# Patient Record
Sex: FEMALE | Race: WHITE | NOT HISPANIC OR LATINO | Employment: OTHER | URBAN - METROPOLITAN AREA
[De-identification: names, ages, dates, MRNs, and addresses within clinical notes are randomized per-mention and may not be internally consistent; named-entity substitution may affect disease eponyms.]

---

## 2017-01-12 DIAGNOSIS — N64.59 OTHER SIGNS AND SYMPTOMS IN BREAST: ICD-10-CM

## 2017-01-12 DIAGNOSIS — R92.8 OTHER ABNORMAL AND INCONCLUSIVE FINDINGS ON DIAGNOSTIC IMAGING OF BREAST: ICD-10-CM

## 2017-01-12 DIAGNOSIS — Z12.31 ENCOUNTER FOR SCREENING MAMMOGRAM FOR MALIGNANT NEOPLASM OF BREAST: ICD-10-CM

## 2017-01-23 ENCOUNTER — GENERIC CONVERSION - ENCOUNTER (OUTPATIENT)
Dept: OTHER | Facility: OTHER | Age: 67
End: 2017-01-23

## 2017-02-08 ENCOUNTER — ALLSCRIPTS OFFICE VISIT (OUTPATIENT)
Dept: OTHER | Facility: OTHER | Age: 67
End: 2017-02-08

## 2017-02-08 ENCOUNTER — TRANSCRIBE ORDERS (OUTPATIENT)
Dept: ADMINISTRATIVE | Facility: HOSPITAL | Age: 67
End: 2017-02-08

## 2017-02-08 DIAGNOSIS — R92.8 ABNORMAL MAMMOGRAM, UNSPECIFIED: Primary | ICD-10-CM

## 2017-02-22 ENCOUNTER — GENERIC CONVERSION - ENCOUNTER (OUTPATIENT)
Dept: OTHER | Facility: OTHER | Age: 67
End: 2017-02-22

## 2017-05-11 ENCOUNTER — ALLSCRIPTS OFFICE VISIT (OUTPATIENT)
Dept: OTHER | Facility: OTHER | Age: 67
End: 2017-05-11

## 2017-05-11 ENCOUNTER — TRANSCRIBE ORDERS (OUTPATIENT)
Dept: LAB | Facility: CLINIC | Age: 67
End: 2017-05-11

## 2017-05-11 ENCOUNTER — APPOINTMENT (OUTPATIENT)
Dept: LAB | Facility: CLINIC | Age: 67
End: 2017-05-11
Payer: MEDICARE

## 2017-05-11 DIAGNOSIS — E03.9 HYPOTHYROIDISM: ICD-10-CM

## 2017-05-11 LAB — TSH SERPL DL<=0.05 MIU/L-ACNC: 2.85 UIU/ML (ref 0.36–3.74)

## 2017-05-11 PROCEDURE — 36415 COLL VENOUS BLD VENIPUNCTURE: CPT

## 2017-05-11 PROCEDURE — 84443 ASSAY THYROID STIM HORMONE: CPT

## 2017-07-31 ENCOUNTER — TRANSCRIBE ORDERS (OUTPATIENT)
Dept: ADMINISTRATIVE | Facility: HOSPITAL | Age: 67
End: 2017-07-31

## 2017-07-31 ENCOUNTER — ALLSCRIPTS OFFICE VISIT (OUTPATIENT)
Dept: OTHER | Facility: OTHER | Age: 67
End: 2017-07-31

## 2017-07-31 ENCOUNTER — TRANSCRIBE ORDERS (OUTPATIENT)
Dept: MAMMOGRAPHY | Facility: CLINIC | Age: 67
End: 2017-07-31

## 2017-07-31 ENCOUNTER — HOSPITAL ENCOUNTER (OUTPATIENT)
Dept: ULTRASOUND IMAGING | Facility: CLINIC | Age: 67
Discharge: HOME | End: 2017-07-31
Payer: COMMERCIAL

## 2017-07-31 ENCOUNTER — APPOINTMENT (OUTPATIENT)
Dept: MAMMOGRAPHY | Facility: CLINIC | Age: 67
End: 2017-07-31
Payer: COMMERCIAL

## 2017-07-31 DIAGNOSIS — R92.8 ABNORMAL MAMMOGRAM, UNSPECIFIED: ICD-10-CM

## 2017-07-31 DIAGNOSIS — R92.8 ABNORMAL MAMMOGRAM, UNSPECIFIED: Primary | ICD-10-CM

## 2017-07-31 DIAGNOSIS — Z12.31 VISIT FOR SCREENING MAMMOGRAM: Primary | ICD-10-CM

## 2017-07-31 PROCEDURE — 76642 ULTRASOUND BREAST LIMITED: CPT

## 2017-11-21 ENCOUNTER — ALLSCRIPTS OFFICE VISIT (OUTPATIENT)
Dept: OTHER | Facility: OTHER | Age: 67
End: 2017-11-21

## 2017-11-21 ENCOUNTER — TRANSCRIBE ORDERS (OUTPATIENT)
Dept: ADMINISTRATIVE | Facility: HOSPITAL | Age: 67
End: 2017-11-21

## 2017-11-21 ENCOUNTER — LAB REQUISITION (OUTPATIENT)
Dept: LAB | Facility: HOSPITAL | Age: 67
End: 2017-11-21
Payer: MEDICARE

## 2017-11-21 ENCOUNTER — APPOINTMENT (OUTPATIENT)
Dept: LAB | Facility: MEDICAL CENTER | Age: 67
End: 2017-11-21
Payer: MEDICARE

## 2017-11-21 DIAGNOSIS — E03.9 HYPOTHYROIDISM: ICD-10-CM

## 2017-11-21 DIAGNOSIS — Z01.419 ENCOUNTER FOR GYNECOLOGICAL EXAMINATION WITHOUT ABNORMAL FINDING: ICD-10-CM

## 2017-11-21 LAB
T4 FREE SERPL-MCNC: 1.08 NG/DL (ref 0.76–1.46)
TSH SERPL DL<=0.05 MIU/L-ACNC: 2.53 UIU/ML (ref 0.36–3.74)

## 2017-11-21 PROCEDURE — 36415 COLL VENOUS BLD VENIPUNCTURE: CPT

## 2017-11-21 PROCEDURE — G0145 SCR C/V CYTO,THINLAYER,RESCR: HCPCS | Performed by: OBSTETRICS & GYNECOLOGY

## 2017-11-21 PROCEDURE — 84439 ASSAY OF FREE THYROXINE: CPT

## 2017-11-21 PROCEDURE — 84443 ASSAY THYROID STIM HORMONE: CPT

## 2017-11-22 NOTE — PROGRESS NOTES
Assessment  1  Post-menopause atrophic vaginitis (627 3) (N95 2)   2  Encounter for routine gynecological examination (V72 31) (Z01 419)    Plan  Hypothyroidism    · (1) T4, FREE; Status:Active; Requested TOT:[id number];    Perform:[facility]; YAH:[id number]; Ordered;For:Hypothyroidism; Ordered By:Josh Sanchez;   · (1) TSH; Status:Active; Requested GBD:[id number];    Perform:[facility]; RBT:[id number]; Ordered;For:Hypothyroidism; Ordered By:Josh Sanchez;  Post-menopause atrophic vaginitis    · Yuvafem 10 MCG Vaginal Tablet; Insert one tablet twice weekly as directed   Rx By: Oscar Holguin; Dispense: 90 Days ; #:26 Tablet; Refill: 3;Post-menopause atrophic vaginitis; ELENA = N; Verified Transmission to [address] [place] [address]; Last Updated By: System, [employer]; 11/21/2017 10:48:29 AM    Discussion/Summary  Pap test was done today Breast cancer screening: mammogram has been ordered and breast cancer screening is managed by Dr Polo Patel  Colorectal cancer screening: colorectal cancer screening is current  Osteoporosis screening: bone mineral density testing has been ordered  Refuill generic vagifemBV reoccurs try MTNZ gel  TSH FT4  Moved to [place], [place]  Will be getting new PCP there to manage hypothyroidism  Chief Complaint  Patient presents today for yearly exam      History of Present Illness  HPI: states she has had recurrent BV infections over past year  Each episode treated with clindamycin crea, Just finished course  No c/o at this timeon vagifem  Needs refill   GYN HM, Adult Female [facility]: The patient is being seen for a gynecology evaluation  The last health maintenance visit was 1 year(s) ago  General Health:  Reproductive health: the patient is postmenopausal    Screening:      Review of Systems   Constitutional: No fever, no chills, feels well, no tiredness, no recent weight gain or loss  Breasts: no complaints of breast pain, breast lump or nipple discharge  Gastrointestinal: no complaints of abdominal pain, no constipation, no nausea or diarrhea, no vomiting, no bloody stools  Genitourinary: no complaints of dysuria, no incontinence, no pelvic pain, no dysmenorrhea, no vaginal discharge or abnormal vaginal bleeding  Active Problems    1  Abnormal finding on breast imaging (793 89) (R92 8)   2  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)   3  Bacterial vaginosis (616 10,041 9) (N76 0,B96 89)   4  Bilateral fibrocystic breast changes (610 1) (N60 11,N60 12)   5  Dense breast tissue (793 82) (R92 2)   6  Elbow tendonitis (727 09) (M77 8)   7  Encounter for screening mammogram for malignant neoplasm of breast (V76 12) (Z12 31)   8  Enthesopathy of elbow (726 30) (M77 8)   9  Hyperlipidemia (272 4) (E78 5)   10  Hypothyroidism (244 9) (E03 9)   11  Influenza vaccination administered during current admission (V04 81) (Z23)   12  Menopause (627 2)   13  Obesity (278 00) (E66 9)   14  Post-menopause atrophic vaginitis (627 3) (N95 2)   15   Venous insufficiency (459 81) (I87 2)    Past Medical History   · History of Acute Supraclavicular Lymphadenitis On The Left (683)   · History of Benign colon polyp (211 3) (K63 5)   · History of Depression (311) (F32 9)   · History of Menarche (V21 8)   · History of Night sweats (780 8) (R61)   · History of Normal endoscopy   · History of Previously Pregnant With 2  Normal Vaginal Deliveries   · History of Summary Of Previous Pregnancies Para 2  (Deliveries)   · History of Trigeminal neuralgia (350 1) (G50 0)   · History of Uses birth control (V25 9) (Z30 9)    Surgical History     · History of Biopsy Breast Percutaneous Needle Core   · History of Cholecystectomy Laparoscopic   · History of Colonoscopy (Fiberoptic) Screening   · History of Foot Surgery   · History of Incisional Breast Biopsy    Family History  Mother    · Family history of hypothyroidism (V18 19) (Z83 49)   · Family history of Heart Disease (V17 49)  Father    · Family history of Liver Cancer   · Family history of Skin Cancer (V16 8)  Sister    · Family history of Heart Disease (V17 49)  Family History    · Family history of Heart disease   · Family history of Liver cancer   · Family history of Skin cancer    Social History     · Currently sexually active   · Lives with adult children   ·    · Moderate alcohol use   · Never A Smoker    Current Meds   1  Clindamycin Phosphate 2 % Vaginal Cream; INSERT 1 APPLICATORFUL INTRAVAGINALLY AT BEDTIMEx 7NIGHTS; Therapy: 63AXZ1305 to (Last Rx:30Oct2017)  Requested for: 76AIG7439 Ordered   2  Levothyroxine Sodium 50 MCG Oral Tablet; Take 1 tablet daily; Therapy: 70Aon7975 to (Last Rx:84Vwy4212)  Requested for: 54KOJ6917 Ordered    Allergies  1  No Known Drug Allergies    Vitals   Recorded: 43HOX9765 08:03JX   Systolic 176   Diastolic 80   Height 5 ft 3 78 in   Weight 197 lb 8 oz   BMI Calculated 34 14   BSA Calculated 1 94   LMP        Physical Exam   Constitutional  General appearance: No acute distress, well appearing and well nourished  Neck  Neck: Normal, supple, trachea midline, no masses  Thyroid: Normal, no thyromegaly  Pulmonary  Respiratory effort: No increased work of breathing or signs of respiratory distress  Auscultation of lungs: Clear to auscultation  Cardiovascular  Auscultation of heart: Normal rate and rhythm, normal S1 and S2, no murmurs  Peripheral vascular exam: Normal pulses Throughout  Genitourinary  External genitalia: Normal and no lesions appreciated  Vagina: Normal, no lesions or dryness appreciated  Urethra: Normal    Urethral meatus: Normal    Bladder: Normal, soft, non-tender and no prolapse or masses appreciated  Cervix: Normal, no palpable masses  Uterus: Normal, non-tender, not enlarged, and no palpable masses  Adnexa/parametria: Normal, non-tender and no fullness or masses appreciated  Chest  Breasts: Normal and no dimpling or skin changes noted     Abdomen  Abdomen: Normal, non-tender, and no organomegaly noted  Liver and spleen: No hepatomegaly or splenomegaly  Examination for hernias: No hernias appreciated  Lymphatic  Palpation of lymph nodes in neck, axillae, groin and/or other locations: No lymphadenopathy or masses noted  Psychiatric  Orientation to person, place, and time: Normal    Mood and affect: Normal        Future Appointments    Date/Time Provider Specialty Site   01/31/2018 11:30 AM DANNA Guillen   Surgical Oncology [facility] [place]       Signatures   Electronically signed by : William Mason DO; Nov 21 2017 11:00AM EST                       (Author)

## 2017-11-30 LAB
LAB AP GYN PRIMARY INTERPRETATION: NORMAL
Lab: NORMAL

## 2018-01-12 VITALS
SYSTOLIC BLOOD PRESSURE: 140 MMHG | WEIGHT: 197.5 LBS | DIASTOLIC BLOOD PRESSURE: 80 MMHG | HEIGHT: 64 IN | BODY MASS INDEX: 33.72 KG/M2

## 2018-01-13 VITALS
SYSTOLIC BLOOD PRESSURE: 126 MMHG | HEIGHT: 65 IN | HEART RATE: 92 BPM | BODY MASS INDEX: 31.57 KG/M2 | WEIGHT: 189.5 LBS | DIASTOLIC BLOOD PRESSURE: 80 MMHG | OXYGEN SATURATION: 96 %

## 2018-01-14 VITALS
TEMPERATURE: 96.5 F | DIASTOLIC BLOOD PRESSURE: 84 MMHG | OXYGEN SATURATION: 98 % | RESPIRATION RATE: 15 BRPM | BODY MASS INDEX: 32.15 KG/M2 | WEIGHT: 193 LBS | HEART RATE: 78 BPM | HEIGHT: 65 IN | SYSTOLIC BLOOD PRESSURE: 122 MMHG

## 2018-01-14 VITALS
WEIGHT: 189.13 LBS | HEIGHT: 65 IN | TEMPERATURE: 98.4 F | BODY MASS INDEX: 31.51 KG/M2 | HEART RATE: 90 BPM | RESPIRATION RATE: 14 BRPM | OXYGEN SATURATION: 97 % | DIASTOLIC BLOOD PRESSURE: 78 MMHG | SYSTOLIC BLOOD PRESSURE: 124 MMHG

## 2018-01-15 NOTE — MISCELLANEOUS
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)   4  Bacterial vaginosis (616 10,041 9) (N76 0,B96 89)   5  Bilateral fibrocystic breast changes (610 1) (N60 11,N60 12)   6  Dense breast tissue (793 82) (R92 2)   7  Elbow tendonitis (727 09) (M77 8)   8  Encounter for consultation (V65 9) (Z71 9)   9  Encounter for routine gynecological examination (V72 31) (Z01 419)   10  Encounter for routine gynecological examination with Papanicolaou smear of cervix    (V72 31,V76 2) (Z01 419)   11  Encounter for screening mammogram for malignant neoplasm of breast (V76 12)    (Z12 31)   12  Hyperlipidemia (272 4) (E78 5)   13  Hypothyroidism (244 9) (E03 9)   14  Menopause (627 2)   15  Obesity (278 00) (E66 9)   16  Post-menopause atrophic vaginitis (627 3) (N95 2)   17  Venous insufficiency (459 81) (I87 2)    Current Meds   1  Levothyroxine Sodium 50 MCG Oral Tablet; Take 1 tablet daily; Therapy: 29Wdo7049 to (Last Rx:73Zgf3288)  Requested for: 58Dcp9685 Ordered   2  Vagifem 10 MCG Vaginal Tablet; INSERT 1 TABLET TWICE WEEKLY AS DIRECTED; Therapy: 67NTV3366 to (Last Doon Splinter)  Requested for: 96QMM2081 Ordered    Allergies    1  No Known Drug Allergies    Plan  Bacterial vaginosis    · Clindamycin Phosphate 2 % Vaginal Cream (Cleocin);  INSERT 1  APPLICATORFUL INTRAVAGINALLY AT BEDTIMEx 7NIGHTS    Signatures   Electronically signed by : Simba Preciado, ; Feb 22 2017 11:41AM EST                       (Author)

## 2018-01-31 ENCOUNTER — HOSPITAL ENCOUNTER (OUTPATIENT)
Dept: MAMMOGRAPHY | Facility: CLINIC | Age: 68
Discharge: HOME | End: 2018-01-31
Payer: MEDICARE

## 2018-01-31 ENCOUNTER — HOSPITAL ENCOUNTER (OUTPATIENT)
Dept: ULTRASOUND IMAGING | Facility: CLINIC | Age: 68
Discharge: HOME | End: 2018-01-31
Payer: MEDICARE

## 2018-01-31 ENCOUNTER — OFFICE VISIT (OUTPATIENT)
Dept: SURGICAL ONCOLOGY | Facility: CLINIC | Age: 68
End: 2018-01-31
Payer: MEDICARE

## 2018-01-31 VITALS
DIASTOLIC BLOOD PRESSURE: 88 MMHG | HEART RATE: 82 BPM | RESPIRATION RATE: 14 BRPM | SYSTOLIC BLOOD PRESSURE: 128 MMHG | WEIGHT: 195 LBS | TEMPERATURE: 97.4 F | BODY MASS INDEX: 32.49 KG/M2 | HEIGHT: 65 IN

## 2018-01-31 DIAGNOSIS — Z12.31 VISIT FOR SCREENING MAMMOGRAM: ICD-10-CM

## 2018-01-31 DIAGNOSIS — R92.2 DENSE BREASTS: ICD-10-CM

## 2018-01-31 DIAGNOSIS — N60.12 BILATERAL FIBROCYSTIC BREAST DISEASE: Primary | ICD-10-CM

## 2018-01-31 DIAGNOSIS — R92.8 OTHER ABNORMAL AND INCONCLUSIVE FINDINGS ON DIAGNOSTIC IMAGING OF BREAST: ICD-10-CM

## 2018-01-31 DIAGNOSIS — N60.11 BILATERAL FIBROCYSTIC BREAST DISEASE: Primary | ICD-10-CM

## 2018-01-31 PROCEDURE — 99213 OFFICE O/P EST LOW 20 MIN: CPT | Performed by: SURGERY

## 2018-01-31 PROCEDURE — 77067 SCR MAMMO BI INCL CAD: CPT

## 2018-01-31 PROCEDURE — 77063 BREAST TOMOSYNTHESIS BI: CPT

## 2018-01-31 PROCEDURE — 76642 ULTRASOUND BREAST LIMITED: CPT

## 2018-01-31 RX ORDER — LEVOTHYROXINE SODIUM 0.05 MG/1
50 TABLET ORAL
COMMUNITY
Start: 2015-09-18 | End: 2018-07-08 | Stop reason: SDUPTHER

## 2018-01-31 NOTE — LETTER
January 31, 2018     Jeanine Weems MD  [address]  [address] [facility]    Patient: Tevin Griffin   YOB: 1950   Date of Visit: 1/31/2018       Dear Dr Chaparrita Quinteros: Thank you for referring Orlando Garrido to me for evaluation  Below are my notes for this consultation  If you have questions, please do not hesitate to call me  I look forward to following your patient along with you  Sincerely,        Debbie Virk MD        CC: No Recipients  Debbie Virk MD  1/31/2018  1:02 PM  Sign at close encounter     Surgical Oncology Follow Up       [facility]  1950  [id number]  [address]  [facility] [address] [address]    No chief complaint on file  Assessment/Plan     Advance Care Planning/Advance Directives:  Did not discuss  with the patient  No history exists  History of Present Illness: follow up breast care  -Interval History:n/a    Review of Systems:  Review of Systems   Constitutional: Negative  Negative for appetite change and fever  Eyes: Negative  Respiratory: Negative for shortness of breath  Cardiovascular: Negative  Gastrointestinal: Negative  Endocrine: Negative  Genitourinary: Negative  Musculoskeletal: Negative  Negative for arthralgias and myalgias  Skin: Negative  Allergic/Immunologic: Negative  Neurological: Negative  Hematological: Negative  Negative for adenopathy  Does not bruise/bleed easily  Psychiatric/Behavioral: Negative          Patient Active Problem List   Diagnosis    Bilateral fibrocystic breast disease    Dense breasts     Past Medical History:   Diagnosis Date    Disease of thyroid gland     Night sweats     Wears glasses      Past Surgical History:   Procedure Laterality Date    BREAST BIOPSY Left dates unk, 1 core bx, 1 excisional bx    CHOLECYSTECTOMY  2010    FOOT SURGERY      2011, 2012 bunionectomy  left iwonae     Family History   Problem Relation Age of Onset    Skin cancer Father      Social History     Social History    Marital status: /Civil Union     Spouse name: N/A    Number of children: N/A    Years of education: N/A     Occupational History    Not on file  Social History Main Topics    Smoking status: Never Smoker    Smokeless tobacco: Never Used    Alcohol use Yes    Drug use: Unknown    Sexual activity: Not on file     Other Topics Concern    Not on file     Social History Narrative    No narrative on file       Current Outpatient Prescriptions:     levothyroxine (SYNTHROID) 50 mcg tablet, Take 50 mcg by mouth, Disp: , Rfl:   No Known Allergies  Vitals:    01/31/18 1147   BP: 128/88   Pulse: 82   Resp: 14   Temp: (!) 97 4 °F (36 3 °C)       Physical Exam   Constitutional: She is oriented to person, place, and time  She appears well-developed and well-nourished  HENT:   Head: Normocephalic and atraumatic  Pulmonary/Chest: Right breast exhibits tenderness  Right breast exhibits no inverted nipple, no mass, no nipple discharge and no skin change  Left breast exhibits tenderness  Left breast exhibits no inverted nipple, no mass, no nipple discharge and no skin change  Lymphadenopathy:        Right axillary: No pectoral and no lateral adenopathy present  Left axillary: No pectoral and no lateral adenopathy present  Right: No supraclavicular adenopathy present  Left: No supraclavicular adenopathy present  Neurological: She is alert and oriented to person, place, and time  Psychiatric: She has a normal mood and affect           Results:    Imaging  Bilateral diagnostic mammogram and left breast ultrasound was done today at the [facility] and is benign/stable; routine screen recommended in one year    I reviewed the above laboratory and imaging data     Discussion/Summary:66 yo female with dense breast tissue and fibrocystic changes  No concerns on exam today or her bilateral mammogram as well as left breast ultrasound done today  I will see her again in six months or sooner should the need arise

## 2018-01-31 NOTE — PROGRESS NOTES
Surgical Oncology Follow Up       [address]  [facility]  1950  [id number]  [address]  [facility] [place] [address]    No chief complaint on file  Assessment/Plan     Advance Care Planning/Advance Directives:  Did not discuss  with the patient  No history exists  History of Present Illness: follow up breast care  -Interval History:n/a    Review of Systems:  Review of Systems   Constitutional: Negative  Negative for appetite change and fever  Eyes: Negative  Respiratory: Negative for shortness of breath  Cardiovascular: Negative  Gastrointestinal: Negative  Endocrine: Negative  Genitourinary: Negative  Musculoskeletal: Negative  Negative for arthralgias and myalgias  Skin: Negative  Allergic/Immunologic: Negative  Neurological: Negative  Hematological: Negative  Negative for adenopathy  Does not bruise/bleed easily  Psychiatric/Behavioral: Negative  Patient Active Problem List   Diagnosis    Bilateral fibrocystic breast disease    Dense breasts     Past Medical History:   Diagnosis Date    Disease of thyroid gland     Night sweats     Wears glasses      Past Surgical History:   Procedure Laterality Date    BREAST BIOPSY Left     dates unk, 1 core bx, 1 excisional bx    CHOLECYSTECTOMY  2010    FOOT SURGERY      2011, 2012 bunionectomy  left hammertoe     Family History   Problem Relation Age of Onset    Skin cancer Father      Social History     Social History    Marital status: /Civil Union     Spouse name: N/A    Number of children: N/A    Years of education: N/A     Occupational History    Not on file       Social History Main Topics    Smoking status: Never Smoker    Smokeless tobacco: Never Used    Alcohol use Yes    Drug use: Unknown    Sexual activity: Not on file     Other Topics Concern    Not on file     Social History Narrative    No narrative on file       Current Outpatient Prescriptions:     levothyroxine (SYNTHROID) 50 mcg tablet, Take 50 mcg by mouth, Disp: , Rfl:   No Known Allergies  Vitals:    01/31/18 1147   BP: 128/88   Pulse: 82   Resp: 14   Temp: (!) 97 4 °F (36 3 °C)       Physical Exam   Constitutional: She is oriented to person, place, and time  She appears well-developed and well-nourished  HENT:   Head: Normocephalic and atraumatic  Pulmonary/Chest: Right breast exhibits tenderness  Right breast exhibits no inverted nipple, no mass, no nipple discharge and no skin change  Left breast exhibits tenderness  Left breast exhibits no inverted nipple, no mass, no nipple discharge and no skin change  Lymphadenopathy:        Right axillary: No pectoral and no lateral adenopathy present  Left axillary: No pectoral and no lateral adenopathy present  Right: No supraclavicular adenopathy present  Left: No supraclavicular adenopathy present  Neurological: She is alert and oriented to person, place, and time  Psychiatric: She has a normal mood and affect  Results:    Imaging  Bilateral diagnostic mammogram and left breast ultrasound was done today at the [facility] and is benign/stable; routine screen recommended in one year    I reviewed the above laboratory and imaging data  Discussion/Summary:68 yo female with dense breast tissue and fibrocystic changes  No concerns on exam today or her bilateral mammogram as well as left breast ultrasound done today  I will see her again in six months or sooner should the need arise

## 2018-02-14 DIAGNOSIS — N76.0 BACTERIAL VAGINITIS: Primary | ICD-10-CM

## 2018-02-14 DIAGNOSIS — B96.89 BACTERIAL VAGINITIS: Primary | ICD-10-CM

## 2018-02-15 ENCOUNTER — DOCUMENTATION (OUTPATIENT)
Dept: GYNECOLOGY | Facility: CLINIC | Age: 68
End: 2018-02-15

## 2018-02-15 ENCOUNTER — TELEPHONE (OUTPATIENT)
Dept: GYNECOLOGY | Facility: CLINIC | Age: 68
End: 2018-02-15

## 2018-02-15 RX ORDER — METRONIDAZOLE 500 MG/1
500 TABLET ORAL EVERY 12 HOURS SCHEDULED
Qty: 14 TABLET | Refills: 0 | Status: SHIPPED | OUTPATIENT
Start: 2018-02-15 | End: 2018-02-22

## 2018-02-15 NOTE — PROGRESS NOTES
Pt now living in [place]  Was seen here and has had  Many courses of treatment for yeast and BV still c/o of odor after finishing treatments, not using Vagifem due to cost but is in process of getting it through [facility]  Spdoke to Dr Krish Vasquez he wants her to be seen, will be in area 3/5  Did make appt maria r want to try oral pills til then  Will give oral Flagyl  Rx called in        Miller Souza

## 2018-03-06 ENCOUNTER — OFFICE VISIT (OUTPATIENT)
Dept: GYNECOLOGY | Facility: CLINIC | Age: 68
End: 2018-03-06
Payer: MEDICARE

## 2018-03-06 VITALS
BODY MASS INDEX: 33.05 KG/M2 | DIASTOLIC BLOOD PRESSURE: 90 MMHG | SYSTOLIC BLOOD PRESSURE: 142 MMHG | HEIGHT: 65 IN | WEIGHT: 198.4 LBS

## 2018-03-06 DIAGNOSIS — N76.0 BACTERIAL VAGINOSIS: ICD-10-CM

## 2018-03-06 DIAGNOSIS — N95.2 ATROPHIC VAGINITIS: Primary | ICD-10-CM

## 2018-03-06 DIAGNOSIS — B96.89 BACTERIAL VAGINOSIS: ICD-10-CM

## 2018-03-06 PROCEDURE — 99213 OFFICE O/P EST LOW 20 MIN: CPT | Performed by: OBSTETRICS & GYNECOLOGY

## 2018-03-06 RX ORDER — TINIDAZOLE 500 MG/1
2 TABLET ORAL
Qty: 8 TABLET | Refills: 0 | Status: SHIPPED | OUTPATIENT
Start: 2018-03-06 | End: 2018-03-08

## 2018-03-06 RX ORDER — ESTRADIOL 0.1 MG/G
CREAM VAGINAL
Qty: 42.5 G | Refills: 3 | Status: SHIPPED | OUTPATIENT
Start: 2018-03-06 | End: 2020-08-25 | Stop reason: ALTCHOICE

## 2018-03-06 NOTE — PROGRESS NOTES
Darryl Whitehead presents to the office complaining of recurrent vaginal odor  She denies any vaginal discharge fever abdominal pain or urinary complaints  She has been tried on clindamycin vaginal cream oral Flagyl with no improvement  She does have vaginal atrophy and has been prescribed vaginal estrogen  She has not started this due to cost issues  She is planning on obtaining the Vagifem them from [facility]  Even generic vaginal generic Vagifem cost 400 dollars  Physical exam abdomen is soft nontender no masses appreciated  On pelvic exam uterus is anteverted normal size contour freely mobile there are no adnexal masses there is a scant vaginal discharge  Sugar swab culture was obtained  There is a significant vaginal atrophy present  Impression:  Atrophic vaginitis  Will start on generic vaginal estrogen Estrace cream   If the cost of this is prohibitive she will fill the Vagifem through [facility]  For possible bacterial vaginosis she has been placed on Tindamax pending vaginal cultures

## 2018-03-09 LAB
A VAGINAE DNA VAG NAA+PROBE-LOG#: NOT DETECTED LOG (CELLS/ML)
C GLABRATA DNA VAG QL NAA+PROBE: NOT DETECTED
C TRACH RRNA SPEC QL NAA+PROBE: NOT DETECTED
CANDIDA DNA VAG QL NAA+PROBE: NOT DETECTED
G VAGINALIS DNA VAG NAA+PROBE-LOG#: NOT DETECTED LOG (CELLS/ML)
LACTOBACILLUS DNA VAG NAA+PROBE-LOG#: DETECTED LOG (CELLS/ML)
MEGASPHAERA SP DNA VAG NAA+PROBE-LOG#: NOT DETECTED LOG (CELLS/ML)
N GONORRHOEA RRNA SPEC QL NAA+PROBE: NOT DETECTED
SL AMB BV CATEGORY:: NORMAL
SL AMB C. PARAPSILOSIS, DNA: NOT DETECTED
SL AMB C. TROPICALIS, DNA: NOT DETECTED
T VAGINALIS RRNA SPEC QL NAA+PROBE: NOT DETECTED

## 2018-07-07 DIAGNOSIS — E03.9 HYPOTHYROIDISM, UNSPECIFIED TYPE: Primary | ICD-10-CM

## 2018-07-08 RX ORDER — LEVOTHYROXINE SODIUM 0.05 MG/1
TABLET ORAL
Qty: 90 TABLET | Refills: 0 | Status: SHIPPED | OUTPATIENT
Start: 2018-07-08

## 2018-08-29 ENCOUNTER — OFFICE VISIT (OUTPATIENT)
Dept: SURGICAL ONCOLOGY | Facility: CLINIC | Age: 68
End: 2018-08-29
Payer: MEDICARE

## 2018-08-29 ENCOUNTER — OFFICE VISIT (OUTPATIENT)
Dept: GYNECOLOGY | Facility: CLINIC | Age: 68
End: 2018-08-29
Payer: MEDICARE

## 2018-08-29 VITALS
BODY MASS INDEX: 32.76 KG/M2 | DIASTOLIC BLOOD PRESSURE: 84 MMHG | SYSTOLIC BLOOD PRESSURE: 138 MMHG | HEIGHT: 65 IN | WEIGHT: 196.6 LBS

## 2018-08-29 VITALS
BODY MASS INDEX: 32.76 KG/M2 | HEART RATE: 67 BPM | DIASTOLIC BLOOD PRESSURE: 72 MMHG | RESPIRATION RATE: 16 BRPM | HEIGHT: 65 IN | SYSTOLIC BLOOD PRESSURE: 140 MMHG | WEIGHT: 196.6 LBS

## 2018-08-29 DIAGNOSIS — Z12.31 SCREENING MAMMOGRAM, ENCOUNTER FOR: ICD-10-CM

## 2018-08-29 DIAGNOSIS — N60.12 BILATERAL FIBROCYSTIC BREAST DISEASE: Primary | ICD-10-CM

## 2018-08-29 DIAGNOSIS — B96.89 BACTERIAL VAGINITIS: ICD-10-CM

## 2018-08-29 DIAGNOSIS — N89.8 VAGINAL ODOR: Primary | ICD-10-CM

## 2018-08-29 DIAGNOSIS — R92.2 DENSE BREASTS: ICD-10-CM

## 2018-08-29 DIAGNOSIS — N76.1 CHRONIC VAGINITIS: ICD-10-CM

## 2018-08-29 DIAGNOSIS — N60.11 BILATERAL FIBROCYSTIC BREAST DISEASE: Primary | ICD-10-CM

## 2018-08-29 DIAGNOSIS — N76.0 BACTERIAL VAGINITIS: ICD-10-CM

## 2018-08-29 PROCEDURE — 99213 OFFICE O/P EST LOW 20 MIN: CPT | Performed by: OBSTETRICS & GYNECOLOGY

## 2018-08-29 PROCEDURE — 99213 OFFICE O/P EST LOW 20 MIN: CPT | Performed by: SURGERY

## 2018-08-29 RX ORDER — METRONIDAZOLE 7.5 MG/G
1 GEL VAGINAL 2 TIMES DAILY
Qty: 70 G | Refills: 0 | Status: SHIPPED | OUTPATIENT
Start: 2018-08-29 | End: 2018-09-01

## 2018-08-29 NOTE — PROGRESS NOTES
C/o chronic vaginal odor unresponsive to cleocin, PO MTNZ  Culture in March was negative  Is using vagifem 2 times/week for atrophy  Denies dischargem fever, urinary complaints    PE: uterus and adnexa: WNL  Scant vaginal discharge  Sure swab taken    IMP: vaginal odor   Start metrogel vaginal cream pending sureswab  Change to premariin cream ( vagifem no longer covered and no longer able to get from [facility]) )

## 2018-08-29 NOTE — PROGRESS NOTES
Surgical Oncology Follow Up       [address] [facility] SURGICAL ONCOLOG[facility]  1950  [id number]  [address]  [facility] [place] [address]    Chief Complaint   Patient presents with    Breast Cancer     6 months       Assessment/Plan   Diagnoses and all orders for this visit:    Bilateral fibrocystic breast disease    Dense breasts    Screening mammogram, encounter for  -     Mammo screening bilateral w 3d & cad; Future        Advance Care Planning/Advance Directives:  Did not discuss  with the patient  Oncology History:     No history exists  History of Present Illness: Follow up   -Interval History: none    Review of Systems:  Review of Systems   Constitutional: Negative  Negative for appetite change and fever  Eyes: Negative  Respiratory: Negative for shortness of breath  Cardiovascular: Negative  Gastrointestinal: Negative  Endocrine: Negative  Genitourinary: Negative  Musculoskeletal: Negative  Negative for arthralgias and myalgias  Skin: Negative  Allergic/Immunologic: Negative  Neurological: Negative  Hematological: Negative  Negative for adenopathy  Does not bruise/bleed easily  Psychiatric/Behavioral: Negative  Patient Active Problem List   Diagnosis    Bilateral fibrocystic breast disease    Dense breasts    Screening mammogram, encounter for     Past Medical History:   Diagnosis Date    Disease of thyroid gland     Night sweats     Wears glasses      Past Surgical History:   Procedure Laterality Date    BREAST BIOPSY Left     dates unk, 1 core bx, 1 excisional bx    CHOLECYSTECTOMY  2010    FOOT SURGERY      2011, 2012 bunionectomy   left hammertoe     Family History   Problem Relation Age of Onset    Skin cancer Father     Cancer Father         liver    Heart disease Mother    Learta January Hyperthyroidism Mother     Heart disease Sister      Social History     Social History    Marital status: /Civil Union     Spouse name: N/A    Number of children: N/A    Years of education: N/A     Occupational History    Not on file  Social History Main Topics    Smoking status: Never Smoker    Smokeless tobacco: Never Used    Alcohol use Yes    Drug use: No    Sexual activity: Not on file     Other Topics Concern    Not on file     Social History Narrative    No narrative on file       Current Outpatient Prescriptions:     conjugated estrogens (PREMARIN) vaginal cream, Insert 1 g into the vagina once a week, Disp: 42 5 g, Rfl: 3    estradiol (ESTRACE) 0 1 mg/g vaginal cream, 1 gram vaginally Monday Wednesday and Friday x 3 weeks then once weekly, Disp: 42 5 g, Rfl: 3    levothyroxine 50 mcg tablet, TAKE 1 TABLET BY MOUTH DAILY, Disp: 90 tablet, Rfl: 0    metroNIDAZOLE (METROGEL) 0 75 % vaginal gel, Insert 1 application into the vagina 2 (two) times a day for 5 doses, Disp: 70 g, Rfl: 0  Allergies   Allergen Reactions    Other        The following portions of the patient's history were reviewed and updated as appropriate: allergies, current medications, past family history, past medical history, past social history, past surgical history and problem list         Vitals:    08/29/18 1142   BP: 140/72   Pulse: 67   Resp: 16       Physical Exam   Constitutional: She is oriented to person, place, and time  She appears well-developed and well-nourished  HENT:   Head: Normocephalic and atraumatic  Pulmonary/Chest: Right breast exhibits tenderness  Right breast exhibits no inverted nipple, no mass, no nipple discharge and no skin change  Left breast exhibits tenderness  Left breast exhibits no inverted nipple, no mass, no nipple discharge and no skin change  Lymphadenopathy:        Right axillary: No pectoral and no lateral adenopathy present          Left axillary: No pectoral and no lateral adenopathy present  Right: No supraclavicular adenopathy present  Left: No supraclavicular adenopathy present  Neurological: She is alert and oriented to person, place, and time  Psychiatric: She has a normal mood and affect  Discussion/Summary:  71-year-old female with dense breast tissue and fibrocystic changes of the breast   There are no concerns on examination today  I will make arrangements for her mammogram which will be due the very beginning of February  Given that she lives in [place], I will plan to see her for her clinical exam the same day    At that time I will then offer for her to go to annual exams along with her mammogram

## 2018-09-01 LAB
A VAGINAE DNA VAG NAA+PROBE-LOG#: NOT DETECTED LOG (CELLS/ML)
C GLABRATA DNA VAG QL NAA+PROBE: NOT DETECTED
C TRACH RRNA SPEC QL NAA+PROBE: NOT DETECTED
CANDIDA DNA VAG QL NAA+PROBE: NOT DETECTED
G VAGINALIS DNA VAG NAA+PROBE-LOG#: NOT DETECTED LOG (CELLS/ML)
LACTOBACILLUS DNA VAG NAA+PROBE-LOG#: 7.4 LOG (CELLS/ML)
MEGASPHAERA SP DNA VAG NAA+PROBE-LOG#: NOT DETECTED LOG (CELLS/ML)
N GONORRHOEA RRNA SPEC QL NAA+PROBE: NOT DETECTED
SL AMB BV CATEGORY:: NORMAL
SL AMB C. PARAPSILOSIS, DNA: NOT DETECTED
SL AMB C. TROPICALIS, DNA: NOT DETECTED
T VAGINALIS RRNA SPEC QL NAA+PROBE: NOT DETECTED

## 2018-10-18 ENCOUNTER — OFFICE VISIT (OUTPATIENT)
Dept: GYNECOLOGY | Facility: CLINIC | Age: 68
End: 2018-10-18
Payer: MEDICARE

## 2018-10-18 VITALS
HEIGHT: 65 IN | SYSTOLIC BLOOD PRESSURE: 122 MMHG | BODY MASS INDEX: 32.46 KG/M2 | WEIGHT: 194.8 LBS | DIASTOLIC BLOOD PRESSURE: 78 MMHG

## 2018-10-18 DIAGNOSIS — N95.0 PMB (POSTMENOPAUSAL BLEEDING): Primary | ICD-10-CM

## 2018-10-18 PROCEDURE — 99213 OFFICE O/P EST LOW 20 MIN: CPT | Performed by: OBSTETRICS & GYNECOLOGY

## 2018-10-18 NOTE — PROGRESS NOTES
Patient presents to the office today complaining of an episode of bright red vaginal bleeding yesterday  She describes it as heavier than spotting  With the minimal cramping  She has no abdominal pain nausea, vomiting, diarrhea, constipation, dysuria, or hematuria, or pelvic pain  Patient is presently on Vagifem for vaginal atrophy  Physical exam:  Uterus is anteverted normal size and contour and freely mobile and nontender  There are no adnexal masses or tenderness  There is old blood in the vaginal canal     Impression:  Postmenopausal bleeding    Plan:  Return to the office for transvaginal sonography saline infusion hysterosonography possible endometrial biopsy

## 2018-10-24 ENCOUNTER — ULTRASOUND (OUTPATIENT)
Dept: GYNECOLOGY | Facility: CLINIC | Age: 68
End: 2018-10-24
Payer: MEDICARE

## 2018-10-24 ENCOUNTER — OFFICE VISIT (OUTPATIENT)
Dept: GYNECOLOGY | Facility: CLINIC | Age: 68
End: 2018-10-24
Payer: MEDICARE

## 2018-10-24 DIAGNOSIS — N95.0 POSTMENOPAUSAL BLEEDING: Primary | ICD-10-CM

## 2018-10-24 DIAGNOSIS — Z01.818 PRE-OP EXAM: Primary | ICD-10-CM

## 2018-10-24 DIAGNOSIS — N95.0 PMB (POSTMENOPAUSAL BLEEDING): Primary | ICD-10-CM

## 2018-10-24 PROCEDURE — 58100 BIOPSY OF UTERUS LINING: CPT | Performed by: OBSTETRICS & GYNECOLOGY

## 2018-10-24 PROCEDURE — 76830 TRANSVAGINAL US NON-OB: CPT | Performed by: OBSTETRICS & GYNECOLOGY

## 2018-10-24 PROCEDURE — 99213 OFFICE O/P EST LOW 20 MIN: CPT | Performed by: OBSTETRICS & GYNECOLOGY

## 2018-10-24 NOTE — PROGRESS NOTES
Assessment/Plan:    No problem-specific Assessment & Plan notes found for this encounter  Diagnoses and all orders for this visit:    PMB (postmenopausal bleeding)        Subjective:      Patient ID: Prema Boyer is a 80 y o  female  HPI  Epsiode of PMB  TVS revealed thickened endometrium with suggestion of submucosal fibroid < 1 cm; Unable to do endometrial biopsy due to stenotic os  Scheduled for hysteroscopy D & C possible myomectomy    The following portions of the patient's history were reviewed and updated as appropriate:   She  has a past medical history of Disease of thyroid gland; Night sweats; and Wears glasses  She   Patient Active Problem List    Diagnosis Date Noted    Screening mammogram, encounter for 08/29/2018    Bilateral fibrocystic breast disease     Dense breasts      She  has a past surgical history that includes Breast biopsy (Left); Cholecystectomy (2010); and Foot surgery  Her family history includes Cancer in her father; Heart disease in her mother and sister; Hyperthyroidism in her mother; Skin cancer in her father  She  reports that she has never smoked  She has never used smokeless tobacco  She reports that she drinks alcohol  She reports that she does not use drugs  Current Outpatient Prescriptions   Medication Sig Dispense Refill    conjugated estrogens (PREMARIN) vaginal cream Insert 1 g into the vagina once a week 42 5 g 3    estradiol (ESTRACE) 0 1 mg/g vaginal cream 1 gram vaginally Monday Wednesday and Friday x 3 weeks then once weekly 42 5 g 3    levothyroxine 50 mcg tablet TAKE 1 TABLET BY MOUTH DAILY 90 tablet 0     No current facility-administered medications for this visit        Current Outpatient Prescriptions on File Prior to Visit   Medication Sig    conjugated estrogens (PREMARIN) vaginal cream Insert 1 g into the vagina once a week    estradiol (ESTRACE) 0 1 mg/g vaginal cream 1 gram vaginally Monday Wednesday and Friday x 3 weeks then once weekly    levothyroxine 50 mcg tablet TAKE 1 TABLET BY MOUTH DAILY     No current facility-administered medications on file prior to visit  She is allergic to other       Review of Systems    Epsiode of PMB  TVS revealed thickened endometrium with suggestion of submucosal fibroid < 1 cm; Unable to do endometrial biopsy due to stenotic os  Scheduled for hysteroscopy D & C possible myomectomy    Objective: There were no vitals taken for this visit  Physical Exam   Constitutional: She appears well-developed and well-nourished  Neck: Normal range of motion  Neck supple  No thyromegaly present  Cardiovascular: Normal rate, regular rhythm and normal heart sounds  Pulmonary/Chest: Effort normal and breath sounds normal  No respiratory distress  Abdominal: Soft  Bowel sounds are normal  She exhibits no distension and no mass  There is no tenderness  There is no rebound and no guarding  Hernia confirmed negative in the right inguinal area and confirmed negative in the left inguinal area  Genitourinary: There is no rash, tenderness or lesion on the right labia  There is no rash, tenderness or lesion on the left labia  Uterus is not deviated, not enlarged, not fixed and not tender  Cervix exhibits no motion tenderness, no discharge and no friability  Right adnexum displays no mass, no tenderness and no fullness  Left adnexum displays no mass, no tenderness and no fullness  There is bleeding in the vagina  No vaginal discharge found  Lymphadenopathy:        Right: No inguinal adenopathy present  Left: No inguinal adenopathy present

## 2018-10-24 NOTE — PROGRESS NOTES
AMB US Pelvic Non OB  Date/Time: 10/24/2018 12:02 PM  Performed by: Vijay Christian by: Burton Oconnell     Procedure details:     Indications: non-obstetric vaginal bleeding      Technique:  Transvaginal US, Non-OB    Position: lithotomy exam    Uterine findings:     Diameter (mm):  33    Length (mm):  61    Width (mm):  45    Endometrial stripe: identified      Endometrium thickness (mm):  6 2  Left ovary findings:     Left ovary:  Visualized    Diameter (mm):  10 5    Length (mm):  15 3    Width (mm):  11 5  Right ovary findings:     Right ovary:  Visualized    Diameter (mm):  11 3    Length (mm):  16    Width (mm):  11 1  Other findings:     Free pelvic fluid: not identified      Free peritoneal fluid: not identified    Post-Procedure Details:     Impression:  Anteverted uterus is inhomogeneous throughout and contains multiple small fibroids  Largest are 6 4mm and 4 9mm both submucosal appearing, 1 3cm anterior subserosal, and 1 0cm anterior cervix  The endometrium contains fluid and appears thickened for postmenopausal patient  The bilateral ovaries appear within normal limits  No free fluid  Tolerance: Tolerated well, no immediate complications    Complications: no complications    Additional Procedure Comments:      [employer] P5 transvaginal transducer E8C with Serial Number [id number] was used during procedure and subsequently cleaned with high level disinfection utilizing the [employer]     Endometrial biopsy  Date/Time: 10/24/2018 12:03 PM  Performed by: Smooth Maya by: Burton Oconnell     Consent:     Consent obtained:  Verbal and written    Patient questions answered: yes      Patient agrees, verbalizes understanding, and wants to proceed: yes    Indication:     Indications: Post-menopausal bleeding    Pre-procedure:     Pre-procedure timeout performed: yes    Procedure:     Procedure: endometrial biopsy with Pipelle      A bivalve speculum was placed in the vagina: yes      Cervix cleaned and prepped: yes      Patient tolerated procedure well with no complications: yes      Unable to perform due to: cervical stenosis

## 2018-10-25 PROBLEM — N92.4 MENOPAUSAL BLEEDING: Status: ACTIVE | Noted: 2018-10-25

## 2018-10-25 PROBLEM — N84.0 ENDOMETRIAL POLYP: Status: ACTIVE | Noted: 2018-10-25

## 2018-11-05 ENCOUNTER — ANESTHESIA EVENT (OUTPATIENT)
Dept: PERIOP | Facility: HOSPITAL | Age: 68
End: 2018-11-05
Payer: MEDICARE

## 2018-11-05 ENCOUNTER — ANESTHESIA (OUTPATIENT)
Dept: PERIOP | Facility: HOSPITAL | Age: 68
End: 2018-11-05
Payer: MEDICARE

## 2018-11-05 ENCOUNTER — HOSPITAL ENCOUNTER (OUTPATIENT)
Facility: HOSPITAL | Age: 68
Setting detail: OUTPATIENT SURGERY
Discharge: HOME | End: 2018-11-05
Attending: OBSTETRICS & GYNECOLOGY | Admitting: OBSTETRICS & GYNECOLOGY
Payer: MEDICARE

## 2018-11-05 VITALS
BODY MASS INDEX: 32.32 KG/M2 | SYSTOLIC BLOOD PRESSURE: 153 MMHG | OXYGEN SATURATION: 97 % | DIASTOLIC BLOOD PRESSURE: 70 MMHG | HEIGHT: 65 IN | WEIGHT: 194 LBS | TEMPERATURE: 97.5 F | RESPIRATION RATE: 16 BRPM | HEART RATE: 70 BPM

## 2018-11-05 DIAGNOSIS — N84.0 ENDOMETRIAL POLYP: ICD-10-CM

## 2018-11-05 DIAGNOSIS — N92.4 MENOPAUSAL BLEEDING: ICD-10-CM

## 2018-11-05 DIAGNOSIS — Z98.890 S/P D&C (STATUS POST DILATION AND CURETTAGE): Primary | ICD-10-CM

## 2018-11-05 PROCEDURE — 58558 HYSTEROSCOPY BIOPSY: CPT | Performed by: OBSTETRICS & GYNECOLOGY

## 2018-11-05 PROCEDURE — 88305 TISSUE EXAM BY PATHOLOGIST: CPT | Performed by: PATHOLOGY

## 2018-11-05 RX ORDER — ALBUTEROL SULFATE 2.5 MG/3ML
2.5 SOLUTION RESPIRATORY (INHALATION) ONCE AS NEEDED
Status: DISCONTINUED | OUTPATIENT
Start: 2018-11-05 | End: 2018-11-05 | Stop reason: HOSPADM

## 2018-11-05 RX ORDER — FENTANYL CITRATE 50 UG/ML
INJECTION, SOLUTION INTRAMUSCULAR; INTRAVENOUS AS NEEDED
Status: DISCONTINUED | OUTPATIENT
Start: 2018-11-05 | End: 2018-11-05 | Stop reason: SURG

## 2018-11-05 RX ORDER — ONDANSETRON 2 MG/ML
INJECTION INTRAMUSCULAR; INTRAVENOUS AS NEEDED
Status: DISCONTINUED | OUTPATIENT
Start: 2018-11-05 | End: 2018-11-05 | Stop reason: SURG

## 2018-11-05 RX ORDER — MIDAZOLAM HYDROCHLORIDE 1 MG/ML
INJECTION INTRAMUSCULAR; INTRAVENOUS AS NEEDED
Status: DISCONTINUED | OUTPATIENT
Start: 2018-11-05 | End: 2018-11-05 | Stop reason: SURG

## 2018-11-05 RX ORDER — ACETAMINOPHEN 325 MG/1
650 TABLET ORAL EVERY 6 HOURS PRN
Status: DISCONTINUED | OUTPATIENT
Start: 2018-11-05 | End: 2018-11-05 | Stop reason: HOSPADM

## 2018-11-05 RX ORDER — FENTANYL CITRATE/PF 50 MCG/ML
25 SYRINGE (ML) INJECTION
Status: DISCONTINUED | OUTPATIENT
Start: 2018-11-05 | End: 2018-11-05 | Stop reason: HOSPADM

## 2018-11-05 RX ORDER — SODIUM CHLORIDE 9 MG/ML
125 INJECTION, SOLUTION INTRAVENOUS CONTINUOUS
Status: DISCONTINUED | OUTPATIENT
Start: 2018-11-05 | End: 2018-11-05 | Stop reason: HOSPADM

## 2018-11-05 RX ORDER — GLYCOPYRROLATE 0.2 MG/ML
INJECTION INTRAMUSCULAR; INTRAVENOUS AS NEEDED
Status: DISCONTINUED | OUTPATIENT
Start: 2018-11-05 | End: 2018-11-05 | Stop reason: SURG

## 2018-11-05 RX ORDER — KETOROLAC TROMETHAMINE 30 MG/ML
INJECTION, SOLUTION INTRAMUSCULAR; INTRAVENOUS AS NEEDED
Status: DISCONTINUED | OUTPATIENT
Start: 2018-11-05 | End: 2018-11-05 | Stop reason: SURG

## 2018-11-05 RX ORDER — IBUPROFEN 600 MG/1
600 TABLET ORAL EVERY 6 HOURS PRN
Qty: 30 TABLET | Refills: 0 | Status: SHIPPED | OUTPATIENT
Start: 2018-11-05 | End: 2020-10-23 | Stop reason: ALTCHOICE

## 2018-11-05 RX ORDER — ONDANSETRON 2 MG/ML
4 INJECTION INTRAMUSCULAR; INTRAVENOUS ONCE AS NEEDED
Status: DISCONTINUED | OUTPATIENT
Start: 2018-11-05 | End: 2018-11-05 | Stop reason: HOSPADM

## 2018-11-05 RX ORDER — DEXAMETHASONE SODIUM PHOSPHATE 4 MG/ML
INJECTION, SOLUTION INTRA-ARTICULAR; INTRALESIONAL; INTRAMUSCULAR; INTRAVENOUS; SOFT TISSUE AS NEEDED
Status: DISCONTINUED | OUTPATIENT
Start: 2018-11-05 | End: 2018-11-05 | Stop reason: SURG

## 2018-11-05 RX ORDER — PROPOFOL 10 MG/ML
INJECTION, EMULSION INTRAVENOUS AS NEEDED
Status: DISCONTINUED | OUTPATIENT
Start: 2018-11-05 | End: 2018-11-05 | Stop reason: SURG

## 2018-11-05 RX ORDER — SODIUM CHLORIDE 9 MG/ML
INJECTION, SOLUTION INTRAVENOUS AS NEEDED
Status: DISCONTINUED | OUTPATIENT
Start: 2018-11-05 | End: 2018-11-05 | Stop reason: HOSPADM

## 2018-11-05 RX ADMIN — ONDANSETRON HYDROCHLORIDE 4 MG: 2 INJECTION, SOLUTION INTRAVENOUS at 07:28

## 2018-11-05 RX ADMIN — LIDOCAINE HYDROCHLORIDE 60 MG: 20 INJECTION, SOLUTION INTRAVENOUS at 07:37

## 2018-11-05 RX ADMIN — SODIUM CHLORIDE: 0.9 INJECTION, SOLUTION INTRAVENOUS at 08:10

## 2018-11-05 RX ADMIN — KETOROLAC TROMETHAMINE 30 MG: 30 INJECTION, SOLUTION INTRAMUSCULAR at 07:40

## 2018-11-05 RX ADMIN — SODIUM CHLORIDE 125 ML/HR: 0.9 INJECTION, SOLUTION INTRAVENOUS at 06:44

## 2018-11-05 RX ADMIN — ACETAMINOPHEN 650 MG: 325 TABLET, FILM COATED ORAL at 09:21

## 2018-11-05 RX ADMIN — FENTANYL CITRATE 50 MCG: 50 INJECTION, SOLUTION INTRAMUSCULAR; INTRAVENOUS at 08:03

## 2018-11-05 RX ADMIN — MIDAZOLAM 2 MG: 1 INJECTION INTRAMUSCULAR; INTRAVENOUS at 07:28

## 2018-11-05 RX ADMIN — PROPOFOL 200 MG: 10 INJECTION, EMULSION INTRAVENOUS at 07:37

## 2018-11-05 RX ADMIN — GLYCOPYRROLATE 0.2 MG: 0.2 INJECTION, SOLUTION INTRAMUSCULAR; INTRAVENOUS at 07:37

## 2018-11-05 RX ADMIN — DEXAMETHASONE SODIUM PHOSPHATE 4 MG: 4 INJECTION, SOLUTION INTRAMUSCULAR; INTRAVENOUS at 07:40

## 2018-11-05 RX ADMIN — FENTANYL CITRATE 50 MCG: 50 INJECTION, SOLUTION INTRAMUSCULAR; INTRAVENOUS at 07:37

## 2018-11-05 NOTE — H&P (VIEW-ONLY)
Assessment/Plan:    No problem-specific Assessment & Plan notes found for this encounter  Diagnoses and all orders for this visit:    PMB (postmenopausal bleeding)        Subjective:      Patient ID: Niru Villeda is a 80 y o  female  HPI  Epsiode of PMB  TVS revealed thickened endometrium with suggestion of submucosal fibroid < 1 cm; Unable to do endometrial biopsy due to stenotic os  Scheduled for hysteroscopy D & C possible myomectomy    The following portions of the patient's history were reviewed and updated as appropriate:   She  has a past medical history of Disease of thyroid gland; Night sweats; and Wears glasses  She   Patient Active Problem List    Diagnosis Date Noted    Screening mammogram, encounter for 08/29/2018    Bilateral fibrocystic breast disease     Dense breasts      She  has a past surgical history that includes Breast biopsy (Left); Cholecystectomy (2010); and Foot surgery  Her family history includes Cancer in her father; Heart disease in her mother and sister; Hyperthyroidism in her mother; Skin cancer in her father  She  reports that she has never smoked  She has never used smokeless tobacco  She reports that she drinks alcohol  She reports that she does not use drugs  Current Outpatient Prescriptions   Medication Sig Dispense Refill    conjugated estrogens (PREMARIN) vaginal cream Insert 1 g into the vagina once a week 42 5 g 3    estradiol (ESTRACE) 0 1 mg/g vaginal cream 1 gram vaginally Monday Wednesday and Friday x 3 weeks then once weekly 42 5 g 3    levothyroxine 50 mcg tablet TAKE 1 TABLET BY MOUTH DAILY 90 tablet 0     No current facility-administered medications for this visit        Current Outpatient Prescriptions on File Prior to Visit   Medication Sig    conjugated estrogens (PREMARIN) vaginal cream Insert 1 g into the vagina once a week    estradiol (ESTRACE) 0 1 mg/g vaginal cream 1 gram vaginally Monday Wednesday and Friday x 3 weeks then once weekly    levothyroxine 50 mcg tablet TAKE 1 TABLET BY MOUTH DAILY     No current facility-administered medications on file prior to visit  She is allergic to other       Review of Systems    Epsiode of PMB  TVS revealed thickened endometrium with suggestion of submucosal fibroid < 1 cm; Unable to do endometrial biopsy due to stenotic os  Scheduled for hysteroscopy D & C possible myomectomy    Objective: There were no vitals taken for this visit  Physical Exam   Constitutional: She appears well-developed and well-nourished  Neck: Normal range of motion  Neck supple  No thyromegaly present  Cardiovascular: Normal rate, regular rhythm and normal heart sounds  Pulmonary/Chest: Effort normal and breath sounds normal  No respiratory distress  Abdominal: Soft  Bowel sounds are normal  She exhibits no distension and no mass  There is no tenderness  There is no rebound and no guarding  Hernia confirmed negative in the right inguinal area and confirmed negative in the left inguinal area  Genitourinary: There is no rash, tenderness or lesion on the right labia  There is no rash, tenderness or lesion on the left labia  Uterus is not deviated, not enlarged, not fixed and not tender  Cervix exhibits no motion tenderness, no discharge and no friability  Right adnexum displays no mass, no tenderness and no fullness  Left adnexum displays no mass, no tenderness and no fullness  There is bleeding in the vagina  No vaginal discharge found  Lymphadenopathy:        Right: No inguinal adenopathy present  Left: No inguinal adenopathy present

## 2018-11-05 NOTE — OP NOTE
OPERATIVE REPORT  PATIENT NAME: Renzo Martin    :  1950  MRN: [id number]  Pt Location: [facility] OR ROOM 01    SURGERY DATE: 2018    Surgeon(s) and Role:     Darryl Musa DO - Primary     * Kate Gallegos MD - Assisting     * Fidel Cook MD - Fellow    Preop Diagnosis:  Menopausal bleeding [N92 4]  Endometrial polyp [N84 0]    Post-Op Diagnosis Codes:     * Menopausal bleeding [N92 4]     * Endometrial polyp [N84 0]    Procedure(s) (LRB):  DILATATION AND CURETTAGE (D&C) WITH HYSTEROSCOP WITH POLYPECTOMY (N/A)    Specimen(s):  ID Type Source Tests Collected by Time Destination   1 : Endometrial currettings and polyp Tissue Endometrium TISSUE EXAM Carlosarnaldo DO Nitesh 2018 0801        Estimated Blood Loss: 5 mL    Anesthesia Type: General    Operative Indications:   Menopausal bleeding [N92 4]  Endometrial polyp [N84 0]    Operative Findings: Stenotic os, polyp    Complications: None    Procedure and Technique:  Patient was taken to the operating room where a time out was performed to confirm correct patient and correct procedure  General LMA anesthesia (LMA) was administered and the patient was positioned on the OR table in the dorsal lithotomy position  All pressure points were padded  A bimanual exam was performed and the uterus was noted to be anteverted, normal in size and consistency with no palpable adnexal masses or fullness  The patient was prepped and draped in the usual sterile fashion  A straight catheter was introduced into the bladder, which was drained of 100cc of clear yellow urine  A weighted speculum was inserted into the vagina and a Rick retractor was used to visualize the anterior lip of the cervix, which was then grasped with a single toothed tenaculum  The uterus was sounded to 7cm  The cervix was serially dilated to 15 using Timothy dilators for introduction of the hysteroscope       Hysteroscope was introduced under direct visualization using normal saline solution as the distention media  Hysteroscope was advanced to the uterine fundus and the entire uterine cavity was inspected in a systematic manner  There was noted to be a polyp on the right uterine sidewall and a smaller polyp on the left side of the fundus  Hysteroscope was withdrawn and sharp curetting was performed, starting at the 12'oclock position and rotating a total of 360 degrees to cover all surfaces  Endometrial tissue was obtained and sent for pathology  The single toothed tenaculum was removed from the anterior lip of the cervix  Good hemostasis was confirmed at the tenaculum puncture sites  Weighted speculum was then removed from the vagina  At the conclusion of the procedure, all needle, sponge, and instrument counts were noted to be correct x2  Dr Tamar Dumont and Dr Muna Calzada were present and participated in all key portions of the case      Patient Disposition:  PACU     SIGNATURE: Gustavo Cotter MD  DATE: November 5, 2018  TIME: 8:10 AM

## 2018-11-05 NOTE — DISCHARGE INSTRUCTIONS
Hysteroscopy   WHAT YOU SHOULD KNOW:   A hysteroscopy is a procedure to look at the inside of your uterus  Other procedures may also be done during the hysteroscopy  AFTER YOU LEAVE:   Medicines:   · Acetaminophen  decreases pain  It is available without a doctor's order  Ask how much to take and how often to take it  Follow directions  Acetaminophen can cause liver damage if not taken correctly  · NSAIDs  help decrease swelling and pain  This medicine is available with or without a doctor's order  NSAIDs can cause stomach bleeding or kidney problems in certain people  If you take blood thinner medicine, always ask your primary healthcare provider (PHP) if NSAIDs are safe for you  Always read the medicine label and follow directions  · Take your medicine as directed  Contact your PHP if you think your medicine is not helping or if you have side effects  Tell him if you are allergic to any medicine  Keep a list of the medicines, vitamins, and herbs you take  Include the amounts, and when and why you take them  Bring the list or the pill bottles to follow-up visits  Carry your medicine list with you in case of an emergency  Follow up with your PHP or gynecologist as directed:  Write down your questions so you remember to ask them during your visits  Activity guidelines: You may feel like resting more after the procedure  Slowly start to do more each day  Rest when you feel it is needed  Ask your PHP when you can return to your daily activities  Self-care:   · Do not put anything into your vagina until your PHP says it is okay  Do not have sex, douche, or use tampons  · You may need to continue to wear a sanitary pad for up to a week  You may have light bleeding or spotting  Contact your PHP if:   · You have a fever  · You have to change your sanitary pad every hour  · You have chills, a cough, or feel weak and achy  · You have abdominal pain that does not go away      · You have abnormal or foul-smelling vaginal discharge  · Your skin is itchy, swollen, or you have a rash  · You have questions or concerns about your condition or care  © 2014 [address] is for End User's use only and may not be sold, redistributed or otherwise used for commercial purposes  All illustrations and images included in CareNotes® are the copyrighted property of [employer]  or [employer]  The above information is an  only  It is not intended as medical advice for individual conditions or treatments  Talk to your doctor, nurse or pharmacist before following any medical regimen to see if it is safe and effective for you  Dilation and Curettage   WHAT YOU NEED TO KNOW:   Dilation and curettage (D&C) is a procedure to remove tissue from the lining of your uterus  DISCHARGE INSTRUCTIONS:   Call 911 for any of the following:   · You have signs of an allergic reaction, such as hives, trouble breathing, or severe swelling  Seek care immediately if:   · You have heavy vaginal bleeding that soaks 1 pad in 1 hour for 2 hours in a row  · You have a fever higher than 100 4°F (38°C)  · You have abdominal cramps for more than 2 days  · Your pain does not get better, even after treatment  Contact your healthcare provider if:   · You have foul-smelling vaginal discharge  · You do not get your monthly period  · You feel depressed or anxious  · You feel very tired and weak  · You have questions or concerns about your condition or care  Medicines: You may need any of the following:  · Prescription pain medicine  may be given  Do not wait until the pain is severe before you take your medicine  Ask your healthcare provider how to take this medicine safely  · Antibiotics  help fight or prevent a bacterial infection  · Take your medicine as directed    Contact your healthcare provider if you think your medicine is not helping or if you have side effects  Tell him or her if you are allergic to any medicine  Keep a list of the medicines, vitamins, and herbs you take  Include the amounts, and when and why you take them  Bring the list or the pill bottles to follow-up visits  Carry your medicine list with you in case of an emergency  Self-care:   · Use sanitary pads if needed  You may have light bleeding for up to 2 weeks  Do not use tampons  Use sanitary pads instead  This will help prevent a vaginal infection  · Rest as needed  Slowly start to do more each day  Return to your daily activities as directed  · Do not have sex for at least 2 weeks after the procedure  This will help prevent an infection  · Use birth control right after your procedure  Your monthly period should start again in 4 to 8 weeks  During this time, you could still ovulate (release an egg)  Use birth control as directed to prevent pregnancy during this time  Follow up with your healthcare provider as directed:  Write down your questions so you remember to ask them during your visits  © 2017 [address] [facility] Information is for End User's use only and may not be sold, redistributed or otherwise used for commercial purposes  All illustrations and images included in CareNotes® are the copyrighted property of [employer]  or [employer]  The above information is an  only  It is not intended as medical advice for individual conditions or treatments  Talk to your doctor, nurse or pharmacist before following any medical regimen to see if it is safe and effective for you

## 2018-11-05 NOTE — ANESTHESIA PREPROCEDURE EVALUATION
Review of Systems/Medical History          Cardiovascular  Negative cardio ROS    Pulmonary  Negative pulmonary ROS        GI/Hepatic  Negative GI/hepatic ROS               Endo/Other  History of thyroid disease ,      GYN       Hematology  Negative hematology ROS      Musculoskeletal  Negative musculoskeletal ROS        Neurology  Negative neurology ROS      Psychology           Physical Exam    Airway    Mallampati score: I  TM Distance: >3 FB  Neck ROM: full     Dental   upper dentures,     Cardiovascular  Comment: Negative ROS, Rate: normal, Cardiovascular exam normal    Pulmonary  Pulmonary exam normal     Other Findings        Anesthesia Plan  ASA Score- 2     Anesthesia Type- general with ASA Monitors  Additional Monitors:   Airway Plan: LMA  Plan Factors-    Induction- intravenous  Postoperative Plan-     Informed Consent- Anesthetic plan and risks discussed with patient

## 2018-11-19 ENCOUNTER — TELEPHONE (OUTPATIENT)
Dept: GYNECOLOGY | Facility: CLINIC | Age: 68
End: 2018-11-19

## 2019-02-18 ENCOUNTER — TELEPHONE (OUTPATIENT)
Dept: SURGICAL ONCOLOGY | Facility: CLINIC | Age: 69
End: 2019-02-18

## 2019-02-18 NOTE — TELEPHONE ENCOUNTER
Attempted returning pt's call, no answer, left a detailed message regarding the need for her breast imaging as she requested

## 2019-03-19 ENCOUNTER — HOSPITAL ENCOUNTER (OUTPATIENT)
Dept: MAMMOGRAPHY | Facility: MEDICAL CENTER | Age: 69
Discharge: HOME | End: 2019-03-19
Payer: MEDICARE

## 2019-03-19 ENCOUNTER — OFFICE VISIT (OUTPATIENT)
Dept: SURGICAL ONCOLOGY | Facility: CLINIC | Age: 69
End: 2019-03-19
Payer: MEDICARE

## 2019-03-19 VITALS — WEIGHT: 194 LBS | HEIGHT: 65 IN | BODY MASS INDEX: 32.32 KG/M2

## 2019-03-19 VITALS
SYSTOLIC BLOOD PRESSURE: 120 MMHG | RESPIRATION RATE: 14 BRPM | HEIGHT: 65 IN | BODY MASS INDEX: 32.49 KG/M2 | WEIGHT: 195 LBS | HEART RATE: 89 BPM | TEMPERATURE: 96.8 F | DIASTOLIC BLOOD PRESSURE: 80 MMHG

## 2019-03-19 DIAGNOSIS — Z12.31 SCREENING MAMMOGRAM, ENCOUNTER FOR: ICD-10-CM

## 2019-03-19 DIAGNOSIS — N60.12 BILATERAL FIBROCYSTIC BREAST DISEASE: Primary | ICD-10-CM

## 2019-03-19 DIAGNOSIS — N60.11 BILATERAL FIBROCYSTIC BREAST DISEASE: Primary | ICD-10-CM

## 2019-03-19 DIAGNOSIS — R92.2 DENSE BREASTS: ICD-10-CM

## 2019-03-19 PROCEDURE — 77067 SCR MAMMO BI INCL CAD: CPT

## 2019-03-19 PROCEDURE — 77063 BREAST TOMOSYNTHESIS BI: CPT

## 2019-03-19 PROCEDURE — 99213 OFFICE O/P EST LOW 20 MIN: CPT | Performed by: SURGERY

## 2019-03-19 PROCEDURE — 1124F ACP DISCUSS-NO DSCNMKR DOCD: CPT | Performed by: SURGERY

## 2019-03-19 RX ORDER — ESTRADIOL 10 UG/1
1 INSERT VAGINAL 2 TIMES WEEKLY
COMMUNITY
End: 2020-08-25 | Stop reason: ALTCHOICE

## 2019-03-19 NOTE — PROGRESS NOTES
Surgical Oncology Follow Up       [facility]  [facility]  1950  [id number]  [address]  [facility] SURGICAL ONCOLOGY [address] [address]    Chief Complaint   Patient presents with    Follow-up     6 months       Assessment/Plan   Diagnoses and all orders for this visit:    Bilateral fibrocystic breast disease    Screening mammogram, encounter for  -     Mammo screening bilateral w 3d & cad; Future    Dense breasts          Advance Care Planning/Advance Directives:  Did not discuss with the patient  Oncology History:     No history exists  History of Present Illness: follow up   -Interval History: none    Review of Systems:  Review of Systems   Constitutional: Negative  Negative for appetite change and fever  Eyes: Negative  Respiratory: Negative for shortness of breath  Cardiovascular: Negative  Gastrointestinal: Negative  Endocrine: Negative  Genitourinary: Negative  Musculoskeletal: Negative  Negative for arthralgias and myalgias  Skin: Negative  Allergic/Immunologic: Negative  Neurological: Negative  Hematological: Negative  Negative for adenopathy  Does not bruise/bleed easily  Psychiatric/Behavioral: Negative  Patient Active Problem List   Diagnosis    Bilateral fibrocystic breast disease    Dense breasts    Screening mammogram, encounter for    Menopausal bleeding    Endometrial polyp     Past Medical History:   Diagnosis Date    Disease of thyroid gland     Night sweats     Wears glasses      Past Surgical History:   Procedure Laterality Date    BREAST BIOPSY Left 2006    dates unk, 1 core bx, 1 excisional bx    CHOLECYSTECTOMY  2010    FOOT SURGERY      2011, 2012 bilateral bunionectomy   left hammertoe    [place] HYSTEROSCOPY,W/ENDO BX N/A 11/5/2018    Procedure: DILATATION AND CURETTAGE (D&C) WITH HYSTEROSCOP WITH POLYPECTOMY;  Surgeon: Kristofer Dowling DO;  Location: [facility];  Service: Gynecology     Family History   Problem Relation Age of Onset    Skin cancer Father 47    Cancer Father 47        liver    Heart disease Mother     Hyperthyroidism Mother     Heart disease Sister     Lung cancer Sister 67     Social History     Socioeconomic History    Marital status: /Civil Union     Spouse name: Not on file    Number of children: Not on file    Years of education: Not on file    Highest education level: Not on file   Occupational History    Not on file   Social Needs    Financial resource strain: Not on file    Food insecurity:     Worry: Not on file     Inability: Not on file    Transportation needs:     Medical: Not on file     Non-medical: Not on file   Tobacco Use    Smoking status: Never Smoker    Smokeless tobacco: Never Used   Substance and Sexual Activity    Alcohol use:  Yes     Alcohol/week: 0 6 oz     Types: 1 Glasses of wine per week     Comment: nightly    Drug use: No    Sexual activity: Not on file   Lifestyle    Physical activity:     Days per week: Not on file     Minutes per session: Not on file    Stress: Not on file   Relationships    Social connections:     Talks on phone: Not on file     Gets together: Not on file     Attends [employer] service: Not on file     Active member of club or organization: Not on file     Attends meetings of clubs or organizations: Not on file     Relationship status: Not on file    Intimate partner violence:     Fear of current or ex partner: Not on file     Emotionally abused: Not on file     Physically abused: Not on file     Forced sexual activity: Not on file   Other Topics Concern    Not on file   Social History Narrative    Not on file       Current Outpatient Medications:     estradiol (VAGIFEM) 10 MCG TABS vaginal tablet, Insert 1 tablet into the vagina 2 (two) times a week, Disp: , Rfl:     levothyroxine 50 mcg tablet, TAKE 1 TABLET BY MOUTH DAILY, Disp: 90 tablet, Rfl: 0    conjugated estrogens (PREMARIN) vaginal cream, Insert 1 g into the vagina once a week (Patient not taking: Reported on 3/19/2019), Disp: 42 5 g, Rfl: 3    estradiol (ESTRACE) 0 1 mg/g vaginal cream, 1 gram vaginally Monday Wednesday and Friday x 3 weeks then once weekly (Patient not taking: Reported on 3/19/2019), Disp: 42 5 g, Rfl: 3    ibuprofen (MOTRIN) 600 mg tablet, Take 1 tablet (600 mg total) by mouth every 6 (six) hours as needed for moderate pain (Patient not taking: Reported on 3/19/2019), Disp: 30 tablet, Rfl: 0  No Known Allergies    The following portions of the patient's history were reviewed and updated as appropriate: allergies, current medications, past family history, past medical history, past social history, past surgical history and problem list         Vitals:    03/19/19 1348   BP: 120/80   Pulse: 89   Resp: 14   Temp: (!) 96 8 °F (36 °C)       Physical Exam   Constitutional: She is oriented to person, place, and time  She appears well-developed and well-nourished  HENT:   Head: Normocephalic and atraumatic  Pulmonary/Chest: Right breast exhibits no inverted nipple, no mass, no nipple discharge, no skin change and no tenderness  Left breast exhibits skin change (Scar)  Left breast exhibits no inverted nipple, no mass, no nipple discharge and no tenderness  Lymphadenopathy:        Right axillary: No pectoral and no lateral adenopathy present  Left axillary: No pectoral and no lateral adenopathy present  Right: No supraclavicular adenopathy present  Left: No supraclavicular adenopathy present  Neurological: She is alert and oriented to person, place, and time  Psychiatric: She has a normal mood and affect  Imaging  03/19/2019 bilateral 3D screening mammogram is benign BI-RADS two with a density of three    I reviewed the above imaging data      Discussion/Summary:  67-year-old female with dense breast tissue and fibrocystic changes of the breast  There are no concerns on exam today or mammogram she had done this morning  I will plan to see her again in one year for another exam the same day as her mammogram as she will be traveling from [place]

## 2020-03-18 ENCOUNTER — TELEPHONE (OUTPATIENT)
Dept: SURGICAL ONCOLOGY | Facility: CLINIC | Age: 70
End: 2020-03-18

## 2020-03-18 NOTE — TELEPHONE ENCOUNTER
Pt has re-scheduled her breast imaging appts for 05/18/20, She was given an appt for F/U with Dr Ulises Knight on 05/18/20 at 11:30 AM  Pt understands and is agreeable

## 2020-03-18 NOTE — TELEPHONE ENCOUNTER
Received a call from pt with concerns regarding her upcoming breast imaging appt and F/U appt with Dr Yee Sin with respect to the [id number] [address]  As per Dr Yee Sin pt was recommended to post pone her appts as pt does not have any current concerns  Pt will R/S her breast imaging appts and call our office following which she will re-schedule with Dr Yee Sin  Pt travels from [place] and would like both appts on the same day  She was instructed to schedule her appts on a Monday or Wednesday

## 2020-05-15 ENCOUNTER — TELEPHONE (OUTPATIENT)
Dept: SURGICAL ONCOLOGY | Facility: CLINIC | Age: 70
End: 2020-05-15

## 2020-05-18 ENCOUNTER — HOSPITAL ENCOUNTER (OUTPATIENT)
Dept: MAMMOGRAPHY | Facility: MEDICAL CENTER | Age: 70
Discharge: HOME | End: 2020-05-18
Payer: MEDICARE

## 2020-05-18 ENCOUNTER — OFFICE VISIT (OUTPATIENT)
Dept: SURGICAL ONCOLOGY | Facility: CLINIC | Age: 70
End: 2020-05-18
Payer: MEDICARE

## 2020-05-18 VITALS — BODY MASS INDEX: 28.99 KG/M2 | HEIGHT: 65 IN | WEIGHT: 174 LBS

## 2020-05-18 VITALS
HEIGHT: 65 IN | BODY MASS INDEX: 29.32 KG/M2 | WEIGHT: 176 LBS | RESPIRATION RATE: 18 BRPM | HEART RATE: 60 BPM | SYSTOLIC BLOOD PRESSURE: 118 MMHG | DIASTOLIC BLOOD PRESSURE: 80 MMHG | TEMPERATURE: 98.5 F

## 2020-05-18 DIAGNOSIS — Z12.31 SCREENING MAMMOGRAM, ENCOUNTER FOR: ICD-10-CM

## 2020-05-18 DIAGNOSIS — N60.12 BILATERAL FIBROCYSTIC BREAST DISEASE: Primary | ICD-10-CM

## 2020-05-18 DIAGNOSIS — N60.11 BILATERAL FIBROCYSTIC BREAST DISEASE: Primary | ICD-10-CM

## 2020-05-18 DIAGNOSIS — Z12.31 VISIT FOR SCREENING MAMMOGRAM: ICD-10-CM

## 2020-05-18 DIAGNOSIS — R92.2 DENSE BREASTS: ICD-10-CM

## 2020-05-18 PROCEDURE — 99213 OFFICE O/P EST LOW 20 MIN: CPT | Performed by: SURGERY

## 2020-05-18 PROCEDURE — 77063 BREAST TOMOSYNTHESIS BI: CPT

## 2020-05-18 PROCEDURE — 77067 SCR MAMMO BI INCL CAD: CPT

## 2020-05-18 RX ORDER — ROSUVASTATIN CALCIUM 5 MG/1
TABLET, COATED ORAL
COMMUNITY
Start: 2019-12-31

## 2020-05-19 ENCOUNTER — TELEPHONE (OUTPATIENT)
Dept: SURGICAL ONCOLOGY | Facility: CLINIC | Age: 70
End: 2020-05-19

## 2020-08-12 ENCOUNTER — DOCUMENTATION (OUTPATIENT)
Dept: GYNECOLOGY | Facility: CLINIC | Age: 70
End: 2020-08-12

## 2020-08-12 NOTE — PROGRESS NOTES
Pt called is having a lot of hot flashes and extreme sweating, with moodiness,  Wanted to try HRT  But needs to check on prices for this I will call with name of recommendation from [facility]    She also will be stopping  The Vagifem tablets feels she does not need it will try  without until her appt  In October   Spoke to Dr Raymundo Cee will try Combipatch not sure if this is menopause  But she can try for 1 month if no improvement will stop

## 2020-08-13 DIAGNOSIS — Z78.0 MENOPAUSE: Primary | ICD-10-CM

## 2020-08-25 DIAGNOSIS — Z79.890 HORMONE REPLACEMENT THERAPY: Primary | ICD-10-CM

## 2020-10-23 ENCOUNTER — ANNUAL EXAM (OUTPATIENT)
Dept: GYNECOLOGY | Facility: CLINIC | Age: 70
End: 2020-10-23
Payer: MEDICARE

## 2020-10-23 VITALS
TEMPERATURE: 97.7 F | DIASTOLIC BLOOD PRESSURE: 78 MMHG | SYSTOLIC BLOOD PRESSURE: 118 MMHG | HEIGHT: 65 IN | HEART RATE: 65 BPM | BODY MASS INDEX: 28.32 KG/M2 | WEIGHT: 170 LBS

## 2020-10-23 DIAGNOSIS — Z01.419 ENCOUNTER FOR GYNECOLOGICAL EXAMINATION WITHOUT ABNORMAL FINDING: ICD-10-CM

## 2020-10-23 DIAGNOSIS — Z78.0 MENOPAUSE: ICD-10-CM

## 2020-10-23 DIAGNOSIS — N95.2 ATROPHIC VAGINITIS: ICD-10-CM

## 2020-10-23 DIAGNOSIS — Z12.4 ENCOUNTER FOR PAPANICOLAOU SMEAR FOR CERVICAL CANCER SCREENING: ICD-10-CM

## 2020-10-23 DIAGNOSIS — Z13.820 ENCOUNTER FOR SCREENING FOR OSTEOPOROSIS: Primary | ICD-10-CM

## 2020-10-23 PROCEDURE — G0145 SCR C/V CYTO,THINLAYER,RESCR: HCPCS | Performed by: OBSTETRICS & GYNECOLOGY

## 2020-10-23 PROCEDURE — G0101 CA SCREEN;PELVIC/BREAST EXAM: HCPCS | Performed by: OBSTETRICS & GYNECOLOGY

## 2020-11-01 LAB
LAB AP GYN PRIMARY INTERPRETATION: NORMAL
Lab: NORMAL

## 2021-05-19 ENCOUNTER — OFFICE VISIT (OUTPATIENT)
Dept: SURGICAL ONCOLOGY | Facility: CLINIC | Age: 71
End: 2021-05-19

## 2021-05-19 ENCOUNTER — HOSPITAL ENCOUNTER (OUTPATIENT)
Dept: MAMMOGRAPHY | Facility: MEDICAL CENTER | Age: 71
Discharge: HOME | End: 2021-05-19
Payer: MEDICARE

## 2021-05-19 VITALS
WEIGHT: 170 LBS | SYSTOLIC BLOOD PRESSURE: 130 MMHG | BODY MASS INDEX: 28.32 KG/M2 | HEART RATE: 82 BPM | DIASTOLIC BLOOD PRESSURE: 80 MMHG | TEMPERATURE: 99 F | HEIGHT: 65 IN

## 2021-05-19 VITALS — HEIGHT: 65 IN | BODY MASS INDEX: 28.32 KG/M2 | WEIGHT: 170 LBS

## 2021-05-19 DIAGNOSIS — R92.2 DENSE BREASTS: ICD-10-CM

## 2021-05-19 DIAGNOSIS — Z12.31 SCREENING MAMMOGRAM, ENCOUNTER FOR: ICD-10-CM

## 2021-05-19 DIAGNOSIS — N60.11 BILATERAL FIBROCYSTIC BREAST DISEASE: Primary | ICD-10-CM

## 2021-05-19 DIAGNOSIS — N60.12 BILATERAL FIBROCYSTIC BREAST DISEASE: Primary | ICD-10-CM

## 2021-05-19 PROCEDURE — 77067 SCR MAMMO BI INCL CAD: CPT

## 2021-05-19 PROCEDURE — 77063 BREAST TOMOSYNTHESIS BI: CPT

## 2021-05-19 PROCEDURE — 99213 OFFICE O/P EST LOW 20 MIN: CPT | Performed by: SURGERY

## 2021-05-19 NOTE — PROGRESS NOTES
Surgical Oncology Follow Up       [facility] SURGICAL ONCOLOGY [facility] [address]    Roby Hall  1950  [id number]  [facility] SURGICAL ONCOLOGY [place] [address]    Chief Complaint   Patient presents with    Follow-up       Assessment/Plan   Diagnoses and all orders for this visit:    Bilateral fibrocystic breast disease    Dense breasts    Screening mammogram, encounter for  -     Mammo screening bilateral w 3d & cad; Future        Advance Care Planning/Advance Directives:  Did not discuss  with the patient  Oncology History:    Oncology History    No history exists  History of Present Illness: Follow-up visit secondary to dense breast tissue and fibrocystic changes, no concerns  -Interval History: mammogram was done this morning    Review of Systems:  Review of Systems   Constitutional: Negative  Negative for appetite change and fever  Eyes: Negative  Respiratory: Negative for shortness of breath  Cardiovascular: Negative  Gastrointestinal: Negative  Endocrine: Negative  Genitourinary: Negative  Musculoskeletal: Negative  Negative for arthralgias and myalgias  Skin: Negative  Allergic/Immunologic: Negative  Neurological: Negative  Hematological: Negative  Negative for adenopathy  Does not bruise/bleed easily  Psychiatric/Behavioral: Negative          Patient Active Problem List   Diagnosis    Bilateral fibrocystic breast disease    Dense breasts    Screening mammogram, encounter for    Menopausal bleeding    Endometrial polyp     Past Medical History:   Diagnosis Date    Disease of thyroid gland     Night sweats     Wears glasses      Past Surgical History:   Procedure Laterality Date    BREAST EXCISIONAL BIOPSY Left 2006    dates unk, 1 core bx, 1 excisional bx    CHOLECYSTECTOMY  2010   [facility]      2011, 2012 bilateral bunionectomy  left hammertoe    [place] HYSTEROSCOPY,W/ENDO BX N/A 11/5/2018    Procedure: DILATATION AND CURETTAGE (D&C) WITH HYSTEROSCOP WITH POLYPECTOMY;  Surgeon: Ani Rodriguez DO;  Location: [facility];  Service: Gynecology     Family History   Problem Relation Age of Onset    Skin cancer Father 47    Cancer Father 47        liver    Heart disease Mother     Hyperthyroidism Mother     Heart disease Sister     Lung cancer Sister 67    No Known Problems Maternal Grandmother     No Known Problems Maternal Grandfather     No Known Problems Paternal Grandmother     No Known Problems Paternal Grandfather     No Known Problems Sister     No Known Problems Sister     No Known Problems Paternal Aunt     No Known Problems Paternal Aunt     No Known Problems Paternal Aunt      Social History     Socioeconomic History    Marital status: /Civil Union     Spouse name: Not on file    Number of children: Not on file    Years of education: Not on file    Highest education level: Not on file   Occupational History    Not on file   Social Needs    Financial resource strain: Not on file    Food insecurity     Worry: Not on file     Inability: Not on file    Transportation needs     Medical: Not on file     Non-medical: Not on file   Tobacco Use    Smoking status: Never Smoker    Smokeless tobacco: Never Used   Substance and Sexual Activity    Alcohol use:  Yes     Alcohol/week: 1 0 standard drinks     Types: 1 Glasses of wine per week     Comment: nightly    Drug use: No    Sexual activity: Not on file   Lifestyle    Physical activity     Days per week: Not on file     Minutes per session: Not on file    Stress: Not on file   Relationships    Social connections     Talks on phone: Not on file     Gets together: Not on file     Attends Anab[employer]ist service: Not on file     Active member of club or organization: Not on file     Attends meetings of clubs or organizations: Not on file Relationship status: Not on file    Intimate partner violence     Fear of current or ex partner: Not on file     Emotionally abused: Not on file     Physically abused: Not on file     Forced sexual activity: Not on file   Other Topics Concern    Not on file   Social History Narrative    Not on file       Current Outpatient Medications:     levothyroxine 50 mcg tablet, TAKE 1 TABLET BY MOUTH DAILY, Disp: 90 tablet, Rfl: 0    rosuvastatin (CRESTOR) 5 mg tablet, , Disp: , Rfl:   No Known Allergies    The following portions of the patient's history were reviewed and updated as appropriate: allergies, current medications, past family history, past medical history, past social history, past surgical history and problem list         Vitals:    05/19/21 1054   BP: 130/80   Pulse: 82   Temp: 99 °F (37 2 °C)       Physical Exam  Constitutional:       General: She is not in acute distress  Appearance: She is well-developed  HENT:      Head: Normocephalic and atraumatic  Chest:      Breasts:         Right: No inverted nipple, mass, nipple discharge, skin change or tenderness  Left: No inverted nipple, mass, nipple discharge, skin change or tenderness  Lymphadenopathy:      Upper Body:      Right upper body: No supraclavicular, axillary or pectoral adenopathy  Left upper body: No supraclavicular, axillary or pectoral adenopathy  Neurological:      Mental Status: She is alert and oriented to person, place, and time  Psychiatric:         Mood and Affect: Mood normal            Results:      Imaging   05/19/2021 bilateral 3D screening mammogram was benign BI-RADS two with a density 4    I reviewed the above imaging data  Discussion/Summary: 36-year-old female with extremely dense breast tissue and fibrocystic changes  There are no concerns on examination today  Her recent mammogram was benign  I will see her again in one year or sooner should the need arise

## 2022-05-23 ENCOUNTER — HOSPITAL ENCOUNTER (OUTPATIENT)
Dept: MAMMOGRAPHY | Facility: MEDICAL CENTER | Age: 72
Discharge: HOME | End: 2022-05-23
Payer: MEDICARE

## 2022-05-23 ENCOUNTER — OFFICE VISIT (OUTPATIENT)
Dept: SURGICAL ONCOLOGY | Facility: CLINIC | Age: 72
End: 2022-05-23
Payer: MEDICARE

## 2022-05-23 VITALS
RESPIRATION RATE: 17 BRPM | HEART RATE: 78 BPM | WEIGHT: 190 LBS | DIASTOLIC BLOOD PRESSURE: 78 MMHG | BODY MASS INDEX: 31.65 KG/M2 | TEMPERATURE: 98.3 F | SYSTOLIC BLOOD PRESSURE: 134 MMHG | HEIGHT: 65 IN | OXYGEN SATURATION: 98 %

## 2022-05-23 VITALS — WEIGHT: 169.97 LBS | HEIGHT: 65 IN | BODY MASS INDEX: 28.32 KG/M2

## 2022-05-23 DIAGNOSIS — R92.2 DENSE BREASTS: ICD-10-CM

## 2022-05-23 DIAGNOSIS — Z12.31 SCREENING MAMMOGRAM, ENCOUNTER FOR: ICD-10-CM

## 2022-05-23 DIAGNOSIS — N60.11 BILATERAL FIBROCYSTIC BREAST DISEASE: Primary | ICD-10-CM

## 2022-05-23 DIAGNOSIS — N60.12 BILATERAL FIBROCYSTIC BREAST DISEASE: Primary | ICD-10-CM

## 2022-05-23 PROCEDURE — 77067 SCR MAMMO BI INCL CAD: CPT

## 2022-05-23 PROCEDURE — 77063 BREAST TOMOSYNTHESIS BI: CPT

## 2022-05-23 PROCEDURE — 99213 OFFICE O/P EST LOW 20 MIN: CPT | Performed by: SURGERY

## 2022-05-23 NOTE — PROGRESS NOTES
Surgical Oncology Follow Up       [facility]  [facility] [place] [address]    Won Asa  1950  [id number]  [facility] SURGICAL ONCOLOGY [place] [address]    Chief Complaint   Patient presents with    Follow-up       Assessment/Plan   Diagnoses and all orders for this visit:    Bilateral fibrocystic breast disease    Dense breasts    Screening mammogram, encounter for  -     Mammo screening bilateral w 3d & cad; Future        Advance Care Planning/Advance Directives:  Did not discuss  with the patient  Oncology History:    Oncology History    No history exists  History of Present Illness: Follow-up visit secondary to the dense breast tissue and fibrocystic changes, reports no concerns herself  -Interval History:  She had a mammogram done today    Review of Systems:  Review of Systems   Constitutional: Negative  Negative for appetite change and fever  Eyes: Negative  Respiratory: Negative for shortness of breath  Cardiovascular: Negative  Gastrointestinal: Negative  Endocrine: Negative  Genitourinary: Negative  Musculoskeletal: Negative  Negative for arthralgias and myalgias  Skin: Negative  Allergic/Immunologic: Negative  Neurological: Negative  Hematological: Negative  Negative for adenopathy  Does not bruise/bleed easily  Psychiatric/Behavioral: Negative          Patient Active Problem List   Diagnosis    Bilateral fibrocystic breast disease    Dense breasts    Screening mammogram, encounter for    Menopausal bleeding    Endometrial polyp     Past Medical History:   Diagnosis Date    Disease of thyroid gland     Night sweats     Wears glasses      Past Surgical History:   Procedure Laterality Date    BREAST EXCISIONAL BIOPSY Left 2006    dates unk, 1 core bx, 1 excisional bx    CHOLECYSTECTOMY  2010    FOOT SURGERY      2011, 2012 bilateral bunionectomy  left hammertoe    [place] HYSTEROSCOPY,W/ENDO BX N/A 11/5/2018    Procedure: DILATATION AND CURETTAGE (D&C) WITH HYSTEROSCOP WITH POLYPECTOMY;  Surgeon: Riky Diggs DO;  Location: [facility];  Service: Gynecology     Family History   Problem Relation Age of Onset    Skin cancer Father 47    Cancer Father 47        liver    Heart disease Mother     Hyperthyroidism Mother     Heart disease Sister     Lung cancer Sister 67    No Known Problems Maternal Grandmother     No Known Problems Maternal Grandfather     No Known Problems Paternal Grandmother     No Known Problems Paternal Grandfather     No Known Problems Sister     No Known Problems Sister     No Known Problems Paternal Aunt     No Known Problems Paternal Aunt     No Known Problems Paternal Aunt      Social History     Socioeconomic History    Marital status: /Civil Union     Spouse name: Not on file    Number of children: Not on file    Years of education: Not on file    Highest education level: Not on file   Occupational History    Not on file   Tobacco Use    Smoking status: Never Smoker    Smokeless tobacco: Never Used   Substance and Sexual Activity    Alcohol use:  Yes     Alcohol/week: 1 0 standard drink     Types: 1 Glasses of wine per week     Comment: nightly    Drug use: No    Sexual activity: Not on file   Other Topics Concern    Not on file   Social History Narrative    Not on file     Social Determinants of Health     Financial Resource Strain: Not on file   Food Insecurity: Not on file   Transportation Needs: Not on file   Physical Activity: Not on file   Stress: Not on file   Social Connections: Not on file   Intimate Partner Violence: Not on file   Housing Stability: Not on file       Current Outpatient Medications:     levothyroxine 50 mcg tablet, TAKE 1 TABLET BY MOUTH DAILY, Disp: 90 tablet, Rfl: 0    rosuvastatin (CRESTOR) 5 mg tablet, , Disp: , Rfl:   No Known Allergies    The following portions of the patient's history were reviewed and updated as appropriate: allergies, current medications, past family history, past medical history, past social history, past surgical history and problem list         Vitals:    05/23/22 0944   BP: 134/78   Pulse: 78   Resp: 17   Temp: 98 3 °F (36 8 °C)   SpO2: 98%       Physical Exam  Constitutional:       General: She is not in acute distress  Appearance: She is well-developed  HENT:      Head: Normocephalic and atraumatic  Chest:   Breasts:      Right: No inverted nipple, mass, nipple discharge, skin change, tenderness, axillary adenopathy or supraclavicular adenopathy  Left: No inverted nipple, mass, nipple discharge, skin change, tenderness, axillary adenopathy or supraclavicular adenopathy  Lymphadenopathy:      Upper Body:      Right upper body: No supraclavicular, axillary or pectoral adenopathy  Left upper body: No supraclavicular, axillary or pectoral adenopathy  Neurological:      Mental Status: She is alert and oriented to person, place, and time  Psychiatric:         Mood and Affect: Mood normal            Results:  Labs:      Imaging  05/23/2022 bilateral 3D screening mammogram is not read, on my review she has dense tissue was benign calcifications some of which are vascular in nature, but no obvious concerns    I reviewed the above imaging data  Discussion/Summary:  52-year-old female followed secondary to the dense breast tissue and fibrocystic changes  There are no concerns on exam today  I personally reviewed her mammogram and I have no concerns with this  We will call her with the results of the formal read  If this is benign, I will plan to see her again in one year or sooner should the need arise

## 2022-05-25 ENCOUNTER — TELEPHONE (OUTPATIENT)
Dept: SURGICAL ONCOLOGY | Facility: CLINIC | Age: 72
End: 2022-05-25

## 2022-05-25 NOTE — TELEPHONE ENCOUNTER
As per Dr Geovanna Luna, called Milvia Lomeli and informed her of her mammogram results, she understands and was appreciative

## 2022-05-25 NOTE — TELEPHONE ENCOUNTER
----- Message from Trae England MD sent at 5/25/2022  3:42 PM EDT -----  Can you call her and let her know her mammogram is good  It was not read yet when I saw her on Monday

## 2023-02-07 ENCOUNTER — ANNUAL EXAM (OUTPATIENT)
Dept: GYNECOLOGY | Facility: CLINIC | Age: 73
End: 2023-02-07

## 2023-02-07 VITALS
WEIGHT: 203.2 LBS | HEART RATE: 72 BPM | SYSTOLIC BLOOD PRESSURE: 120 MMHG | BODY MASS INDEX: 34.69 KG/M2 | HEIGHT: 64 IN | DIASTOLIC BLOOD PRESSURE: 74 MMHG

## 2023-02-07 DIAGNOSIS — Z01.419 ENCOUNTER FOR GYNECOLOGICAL EXAMINATION WITHOUT ABNORMAL FINDING: Primary | ICD-10-CM

## 2023-02-07 RX ORDER — HYDROCHLOROTHIAZIDE 25 MG/1
25 TABLET ORAL DAILY
COMMUNITY
Start: 2023-02-01

## 2023-02-07 NOTE — PROGRESS NOTES
Assessment/Plan:         Diagnoses and all orders for this visit:    Encounter for gynecological examination without abnormal finding    Other orders  -     hydrochlorothiazide (HYDRODIURIL) 25 mg tablet; Take 25 mg by mouth daily        Subjective:      Patient ID: Chucho Figueroa is a 71 y o  female  HPI patient presents for annual examination  She offers no complaints  Denies any vaginal irritation, burning, discharge or bleeding  Denies any dysuria, hematuria urgency or urinary incontinence  No GI complaints  Colonoscopy 2018  This is managed by Dr Solitario  Patient has appointment later today for consultation for repeat colonoscopy  DEXA scan January 2023  Normal    The following portions of the patient's history were reviewed and updated as appropriate:   She  has a past medical history of Disease of thyroid gland, Night sweats, and Wears glasses  She   Patient Active Problem List    Diagnosis Date Noted   • Menopausal bleeding 10/25/2018   • Endometrial polyp 10/25/2018   • Screening mammogram, encounter for 08/29/2018   • Bilateral fibrocystic breast disease    • Dense breasts      She  has a past surgical history that includes Cholecystectomy (2010); Foot surgery; pr hysteroscopy bx endometrium&/polypc w/wo d&c (N/A, 11/5/2018); and Breast excisional biopsy (Left, 2006)  Her family history includes Cancer (age of onset: 47) in her father; Heart disease in her mother and sister; Hyperthyroidism in her mother; Lung cancer (age of onset: 67) in her sister; No Known Problems in her maternal grandfather, maternal grandmother, paternal aunt, paternal aunt, paternal aunt, paternal grandfather, paternal grandmother, sister, and sister; Skin cancer (age of onset: 47) in her father  She  reports that she has never smoked  She has never used smokeless tobacco  She reports current alcohol use of about 1 0 standard drink per week  She reports that she does not use drugs    Current Outpatient Medications   Medication Sig Dispense Refill   • hydrochlorothiazide (HYDRODIURIL) 25 mg tablet Take 25 mg by mouth daily     • levothyroxine 50 mcg tablet TAKE 1 TABLET BY MOUTH DAILY 90 tablet 0   • rosuvastatin (CRESTOR) 5 mg tablet        No current facility-administered medications for this visit  Current Outpatient Medications on File Prior to Visit   Medication Sig   • hydrochlorothiazide (HYDRODIURIL) 25 mg tablet Take 25 mg by mouth daily   • levothyroxine 50 mcg tablet TAKE 1 TABLET BY MOUTH DAILY   • rosuvastatin (CRESTOR) 5 mg tablet      No current facility-administered medications on file prior to visit  She has No Known Allergies       Review of Systems   Constitutional: Negative  HENT: Negative for sore throat and trouble swallowing  Gastrointestinal: Negative  Genitourinary: Negative  Objective:      /74   Pulse 72   Ht 5' 4" (1 626 m)   Wt 92 2 kg (203 lb 3 2 oz)   BMI 34 88 kg/m²          Physical Exam  Vitals reviewed  Cardiovascular:      Rate and Rhythm: Normal rate and regular rhythm  Pulses: Normal pulses  Heart sounds: Normal heart sounds  No murmur heard  Pulmonary:      Effort: Pulmonary effort is normal  No respiratory distress  Breath sounds: Normal breath sounds  Chest:   Breasts:     Right: No swelling, bleeding, inverted nipple, mass, nipple discharge, skin change or tenderness  Left: No swelling, bleeding, inverted nipple, mass, nipple discharge, skin change or tenderness  Abdominal:      General: There is no distension  Palpations: Abdomen is soft  There is no mass  Tenderness: There is no abdominal tenderness  There is no guarding or rebound  Hernia: No hernia is present  There is no hernia in the left inguinal area or right inguinal area  Genitourinary:     General: Normal vulva  Labia:         Right: No rash, tenderness or lesion  Left: No rash, tenderness or lesion         Comments: Uterus anteverted normal size and contour freely mobile and nontender  No palpable adnexal masses or tenderness  Vagina with atrophic changes  Bartholin's and Viborg's glands normal   Urethral orifice normal   No cystocele or rectocele  Musculoskeletal:      Cervical back: Normal range of motion and neck supple  No tenderness  Lymphadenopathy:      Cervical: No cervical adenopathy  Upper Body:      Right upper body: No supraclavicular, axillary or pectoral adenopathy  Left upper body: No supraclavicular, axillary or pectoral adenopathy  Lower Body: No right inguinal adenopathy  No left inguinal adenopathy  Neurological:      Mental Status: She is alert

## 2023-02-14 LAB
LAB AP GYN PRIMARY INTERPRETATION: NORMAL
Lab: NORMAL

## 2023-05-24 ENCOUNTER — OFFICE VISIT (OUTPATIENT)
Dept: SURGICAL ONCOLOGY | Facility: CLINIC | Age: 73
End: 2023-05-24

## 2023-05-24 ENCOUNTER — HOSPITAL ENCOUNTER (OUTPATIENT)
Dept: MAMMOGRAPHY | Facility: MEDICAL CENTER | Age: 73
Discharge: HOME | End: 2023-05-24

## 2023-05-24 VITALS
BODY MASS INDEX: 32.32 KG/M2 | TEMPERATURE: 99 F | SYSTOLIC BLOOD PRESSURE: 130 MMHG | WEIGHT: 194 LBS | HEIGHT: 65 IN | HEART RATE: 72 BPM | OXYGEN SATURATION: 98 % | DIASTOLIC BLOOD PRESSURE: 78 MMHG

## 2023-05-24 VITALS — WEIGHT: 194 LBS | BODY MASS INDEX: 32.32 KG/M2 | HEIGHT: 65 IN

## 2023-05-24 DIAGNOSIS — N60.12 BILATERAL FIBROCYSTIC BREAST DISEASE: Primary | ICD-10-CM

## 2023-05-24 DIAGNOSIS — Z12.31 SCREENING MAMMOGRAM, ENCOUNTER FOR: ICD-10-CM

## 2023-05-24 DIAGNOSIS — N60.11 BILATERAL FIBROCYSTIC BREAST DISEASE: Primary | ICD-10-CM

## 2023-05-24 DIAGNOSIS — R92.2 DENSE BREASTS: ICD-10-CM

## 2023-05-24 NOTE — PROGRESS NOTES
Surgical Oncology Follow Up       [address] [facility] [facility] [place]  [facility] [place] [address]    Jeffrey Sorenson  1950  [id number]  [facility] [place] [address]    Chief Complaint   Patient presents with   • Follow-up       Assessment/Plan   Diagnoses and all orders for this visit:    Bilateral fibrocystic breast disease    Dense breasts    Screening mammogram, encounter for  -     Mammo screening bilateral w 3d & cad; Future        Advance Care Planning/Advance Directives:  Did not discuss  with the patient  Oncology History:    Oncology History    No history exists  History of Present Illness: Follow-up visit secondary to dense breast tissue and fibrocystic changes, continues to feel some lumpiness and discomfort but no new concerns  -Interval History: Other issues as noted in ROS, had her mammogram this morning    Review of Systems:  Review of Systems   Constitutional: Negative  Negative for appetite change and fever  Eyes: Negative  Respiratory: Negative for shortness of breath  Cardiovascular:        Having issues with BP   Gastrointestinal: Negative  Endocrine: Negative  Genitourinary: Negative  Musculoskeletal: Negative  Negative for arthralgias and myalgias  Skin: Positive for color change ( dermatitis of eye lid)  Allergic/Immunologic: Negative  Neurological: Negative  Hematological: Negative  Negative for adenopathy  Does not bruise/bleed easily  Psychiatric/Behavioral: Negative          Patient Active Problem List   Diagnosis   • Bilateral fibrocystic breast disease   • Dense breasts   • Screening mammogram, encounter for   • Menopausal bleeding   • Endometrial polyp     Past Medical History:   Diagnosis Date   • Disease of thyroid gland    • Night sweats    • Wears glasses      Past Surgical History:   Procedure Laterality Date   • BREAST EXCISIONAL BIOPSY Left 2006    dates unk, 1 core bx, 1 excisional bx   • CHOLECYSTECTOMY  2010   • FOOT SURGERY      2011, 2012 bilateral bunionectomy  left hammertoe   • CT HYSTEROSCOPY BX ENDOMETRIUM&/POLYPC W/WO D&C N/A 11/5/2018    Procedure: DILATATION AND CURETTAGE (D&C) WITH HYSTEROSCOP WITH POLYPECTOMY;  Surgeon: Tapan Smith DO;  Location: [facility];  Service: Gynecology     Family History   Problem Relation Age of Onset   • Skin cancer Father 47   • Cancer Father 47        liver   • Heart disease Mother    • Hyperthyroidism Mother    • Heart disease Sister    • Lung cancer Sister 67   • No Known Problems Maternal Grandmother    • No Known Problems Maternal Grandfather    • No Known Problems Paternal Grandmother    • No Known Problems Paternal Grandfather    • No Known Problems Sister    • No Known Problems Sister    • No Known Problems Paternal Aunt    • No Known Problems Paternal Aunt    • No Known Problems Paternal Aunt      Social History     Socioeconomic History   • Marital status: /Civil Union     Spouse name: Not on file   • Number of children: Not on file   • Years of education: Not on file   • Highest education level: Not on file   Occupational History   • Not on file   Tobacco Use   • Smoking status: Never   • Smokeless tobacco: Never   Vaping Use   • Vaping Use: Never used   Substance and Sexual Activity   • Alcohol use:  Yes     Alcohol/week: 1 0 standard drink of alcohol     Types: 1 Glasses of wine per week     Comment: nightly   • Drug use: No   • Sexual activity: Not Currently     Birth control/protection: Post-menopausal   Other Topics Concern   • Not on file   Social History Narrative   • Not on file     Social Determinants of Health     Financial Resource Strain: Not on file   Food Insecurity: Not on file   Transportation Needs: Not on file   Physical Activity: Not on file   Stress: Not on file   Social Connections: Not on file   Intimate Partner Violence: Not on file   Housing Stability: Not on file       Current Outpatient Medications:   •  hydrochlorothiazide (HYDRODIURIL) 25 mg tablet, Take 25 mg by mouth daily, Disp: , Rfl:   •  levothyroxine 50 mcg tablet, TAKE 1 TABLET BY MOUTH DAILY, Disp: 90 tablet, Rfl: 0  •  rosuvastatin (CRESTOR) 5 mg tablet, , Disp: , Rfl:   No Known Allergies    The following portions of the patient's history were reviewed and updated as appropriate: allergies, current medications, past family history, past medical history, past social history, past surgical history and problem list         Vitals:    05/24/23 0853   BP: 130/78   Pulse: 72   Temp: 99 °F (37 2 °C)   SpO2: 98%       Physical Exam  Constitutional:       General: She is not in acute distress  Appearance: Normal appearance  She is well-developed  Chest:   Breasts:     Right: No inverted nipple, mass, nipple discharge, skin change or tenderness  Left: No inverted nipple, mass, nipple discharge, skin change or tenderness  Lymphadenopathy:      Upper Body:      Right upper body: No supraclavicular, axillary or pectoral adenopathy  Left upper body: No supraclavicular, axillary or pectoral adenopathy  Neurological:      Mental Status: She is alert and oriented to person, place, and time  Psychiatric:         Mood and Affect: Mood normal            Results:  Labs:      Imaging  5/24/2023 bilateral 3D screening mammogram was benign BI-RADS 2 with a density of 3    I reviewed the above imaging data  Discussion/Summary: 52-year-old female with dense breast tissue and fibrocystic changes  There are no concerns on exam today  Her mammogram she had this morning was also benign  She continues to have dense tissue on her imaging    I will plan to see her again in 1 year the same day as her mammo for another exam

## 2024-05-30 ENCOUNTER — OFFICE VISIT (OUTPATIENT)
Dept: SURGICAL ONCOLOGY | Facility: CLINIC | Age: 74
End: 2024-05-30
Payer: MEDICARE

## 2024-05-30 ENCOUNTER — HOSPITAL ENCOUNTER (OUTPATIENT)
Dept: ULTRASOUND IMAGING | Facility: CLINIC | Age: 74
Discharge: HOME | End: 2024-05-30
Payer: MEDICARE

## 2024-05-30 ENCOUNTER — TELEPHONE (OUTPATIENT)
Dept: SURGICAL ONCOLOGY | Facility: CLINIC | Age: 74
End: 2024-05-30

## 2024-05-30 ENCOUNTER — HOSPITAL ENCOUNTER (OUTPATIENT)
Dept: MAMMOGRAPHY | Facility: MEDICAL CENTER | Age: 74
Discharge: HOME | End: 2024-05-30
Payer: MEDICARE

## 2024-05-30 VITALS — WEIGHT: 194 LBS | BODY MASS INDEX: 32.32 KG/M2 | HEIGHT: 65 IN

## 2024-05-30 VITALS
HEART RATE: 71 BPM | BODY MASS INDEX: 32.28 KG/M2 | TEMPERATURE: 98.5 F | RESPIRATION RATE: 16 BRPM | OXYGEN SATURATION: 95 % | HEIGHT: 65 IN | SYSTOLIC BLOOD PRESSURE: 134 MMHG | DIASTOLIC BLOOD PRESSURE: 86 MMHG

## 2024-05-30 DIAGNOSIS — N60.11 BILATERAL FIBROCYSTIC BREAST DISEASE: ICD-10-CM

## 2024-05-30 DIAGNOSIS — Z12.31 SCREENING MAMMOGRAM, ENCOUNTER FOR: ICD-10-CM

## 2024-05-30 DIAGNOSIS — R92.333 HETEROGENEOUSLY DENSE TISSUE OF BOTH BREASTS ON MAMMOGRAPHY: Primary | ICD-10-CM

## 2024-05-30 DIAGNOSIS — R92.8 ABNORMAL MAMMOGRAM OF LEFT BREAST: ICD-10-CM

## 2024-05-30 DIAGNOSIS — N60.12 BILATERAL FIBROCYSTIC BREAST DISEASE: ICD-10-CM

## 2024-05-30 PROCEDURE — 77067 SCR MAMMO BI INCL CAD: CPT

## 2024-05-30 PROCEDURE — 76642 ULTRASOUND BREAST LIMITED: CPT

## 2024-05-30 PROCEDURE — 99213 OFFICE O/P EST LOW 20 MIN: CPT | Performed by: SURGERY

## 2024-05-30 PROCEDURE — 77063 BREAST TOMOSYNTHESIS BI: CPT

## 2024-05-30 NOTE — TELEPHONE ENCOUNTER
Called the patient to follow up from her breast ultrasound results but there was no answer. A very brief message was left requesting a call back. A direct phone number was provided.

## 2024-05-30 NOTE — PROGRESS NOTES
Surgical Oncology Follow Up       [address]  [facility]  [address]  [facility] [id number]    Elisa Al  1950  [id number]  [address]  [facility]  [address] [place] [address]    Chief Complaint   Patient presents with    Follow-up       Assessment & Plan   Diagnoses and all orders for this visit:    Heterogeneously dense tissue of both breasts on mammography    Bilateral fibrocystic breast disease    Screening mammogram, encounter for  -     Mammo screening bilateral w 3d & cad; Future    Abnormal mammogram of left breast  -     US breast left limited (diagnostic); Future        Advance Care Planning/Advance Directives:  Did not discuss  with the patient.     Oncology History:    Oncology History    No history exists.       History of Present Illness: Follow-up visit secondary to dense breast tissue and fibrocystic changes, denies any breast referable concerns, had her mammogram this morning  -Interval History: As noted    Review of Systems:  Review of Systems   Constitutional: Negative.  Negative for appetite change, fever and unexpected weight change.   HENT: Negative.  Negative for trouble swallowing.    Eyes: Negative.    Respiratory:  Positive for cough. Negative for shortness of breath.    Cardiovascular: Negative.  Negative for chest pain.   Gastrointestinal: Negative.  Negative for abdominal pain, nausea and vomiting.   Endocrine: Negative.    Genitourinary: Negative.  Negative for dysuria.   Musculoskeletal: Negative.  Negative for arthralgias and myalgias.   Skin: Negative.    Allergic/Immunologic: Negative.    Neurological: Negative.  Negative for headaches.   Hematological: Negative.  Negative for adenopathy. Does not bruise/bleed easily.   Psychiatric/Behavioral: Negative.         Patient Active Problem List   Diagnosis    Bilateral fibrocystic breast disease     Heterogeneously dense tissue of both breasts on mammography    Screening mammogram, encounter for    Menopausal bleeding    Endometrial polyp    Abnormal mammogram of left breast     Past Medical History:   Diagnosis Date    Disease of thyroid gland     Night sweats     Wears glasses      Past Surgical History:   Procedure Laterality Date    BREAST EXCISIONAL BIOPSY Left 2006    dates unk, 1 core bx, 1 excisional bx    CHOLECYSTECTOMY  2010    FOOT SURGERY      2011, 2012 bilateral bunionectomy. left hammertoe    ME HYSTEROSCOPY BX ENDOMETRIUM&/POLYPC W/WO D&C N/A 11/5/2018    Procedure: DILATATION AND CURETTAGE (D&C) WITH HYSTEROSCOP WITH POLYPECTOMY;  Surgeon: Amadeo Sanchez DO;  Location: [facility];  Service: Gynecology     Family History   Problem Relation Age of Onset    Heart disease Mother     Hyperthyroidism Mother     Skin cancer Father 54    Liver cancer Father 54    Heart disease Sister     Lung cancer Sister 72    No Known Problems Sister     No Known Problems Sister     No Known Problems Maternal Grandmother     No Known Problems Maternal Grandfather     No Known Problems Paternal Grandmother     No Known Problems Paternal Grandfather     No Known Problems Paternal Aunt     No Known Problems Paternal Aunt     No Known Problems Paternal Aunt      Social History     Socioeconomic History    Marital status: /Civil Union     Spouse name: Not on file    Number of children: Not on file    Years of education: Not on file    Highest education level: Not on file   Occupational History    Not on file   Tobacco Use    Smoking status: Never    Smokeless tobacco: Never   Vaping Use    Vaping status: Never Used   Substance and Sexual Activity    Alcohol use: Yes     Alcohol/week: 1.0 standard drink of alcohol     Types: 1 Glasses of wine per week     Comment: nightly    Drug use: No    Sexual activity: Not Currently     Birth control/protection: Post-menopausal   Other Topics Concern    Not on file    Social History Narrative    Not on file     Social Determinants of Health     Financial Resource Strain: Not on file   Food Insecurity: Not on file   Transportation Needs: Not on file   Physical Activity: Not on file   Stress: Not on file   Social Connections: Not on file   Intimate Partner Violence: Not on file   Housing Stability: Not on file       Current Outpatient Medications:     hydrochlorothiazide (HYDRODIURIL) 25 mg tablet, Take 25 mg by mouth daily, Disp: , Rfl:     levothyroxine 50 mcg tablet, TAKE 1 TABLET BY MOUTH DAILY, Disp: 90 tablet, Rfl: 0    rosuvastatin (CRESTOR) 5 mg tablet, , Disp: , Rfl:   No Known Allergies    The following portions of the patient's history were reviewed and updated as appropriate: allergies, current medications, past family history, past medical history, past social history, past surgical history, and problem list.        Vitals:    05/30/24 0856   BP: 134/86   Pulse: 71   Resp: 16   Temp: 98.5 °F (36.9 °C)   SpO2: 95%       Physical Exam  Constitutional:       General: She is not in acute distress.     Appearance: Normal appearance. She is well-developed.   HENT:      Head: Normocephalic and atraumatic.   Chest:   Breasts:     Right: No swelling, bleeding, inverted nipple, mass, nipple discharge, skin change or tenderness.      Left: Tenderness (upper outer) present. No swelling, bleeding, inverted nipple, mass, nipple discharge or skin change.      Comments: Nodulariity L>R, no discrete masses  Musculoskeletal:      Right lower leg: No edema.      Left lower leg: No edema.   Lymphadenopathy:      Upper Body:      Right upper body: No supraclavicular, axillary or pectoral adenopathy.      Left upper body: No supraclavicular, axillary or pectoral adenopathy.   Neurological:      Mental Status: She is alert and oriented to person, place, and time.   Psychiatric:         Mood and Affect: Mood normal.           Results:  Labs:      Imaging  5/30/2024 bilateral 3D screening  mammogram was a BI-RADS 0 secondary to 2 rounded masses in the left breast thought to represent cysts for which an ultrasound was recommended, right side is benign, density is a 3    I reviewed the above imaging data.    Discussion/Summary: 73-year-old female with dense breast tissue and fibrocystic changes.  There are no concerns 1 exam today.  She had an abnormal screening mammogram this morning.  A left breast ultrasound is recommended.  She lives in [place] so I am trying to arrange for this to be done today.  If the area of concern indeed represents fibrocystic changes, then she will be able to return to a screening mammogram next year.  I will see her again at that time or sooner should the need arise.

## 2025-05-23 ENCOUNTER — TELEPHONE (OUTPATIENT)
Dept: SURGICAL ONCOLOGY | Facility: CLINIC | Age: 75
End: 2025-05-23

## 2025-05-23 NOTE — TELEPHONE ENCOUNTER
Left voicemail for patient in regards to rescheduling appointment that patient has with Dr. Simms on 6/19 due to Dr. Simms's schedule changing. Please put patient with a NP. Provided the hopeline number and put a message in her MyChart.

## 2025-06-09 ENCOUNTER — TELEPHONE (OUTPATIENT)
Dept: SURGICAL ONCOLOGY | Facility: CLINIC | Age: 75
End: 2025-06-09

## 2025-06-09 ENCOUNTER — TELEPHONE (OUTPATIENT)
Age: 75
End: 2025-06-09

## 2025-06-09 NOTE — TELEPHONE ENCOUNTER
Spoke with patient and rescheduled appointment on 7/7 with Dr. Simms per patient's request. She wanted to be seen the same day as her mammogram. She will be seen on 8/6 at 8:45am OK per Dr. Simms.

## 2025-06-09 NOTE — TELEPHONE ENCOUNTER
Patient is calling regarding her rescheduled appointment with Dr Simms on 7/7, she needed to reschedule her mammogram.  The new mammogram appointment in 8/6 @ 7:40 am, she is hoping to get into see Dr Simms on the same day she stated that is the way she has always done it.  She currently lives is [place].  She is hoping to get scheduled with Dr Simms on 8/6 @ 9:00 am for her follow up, I am hoping someone can assist me with this.  Please call Elisa to confirm   12/20/20

## 2025-08-06 ENCOUNTER — HOSPITAL ENCOUNTER (OUTPATIENT)
Dept: MAMMOGRAPHY | Facility: MEDICAL CENTER | Age: 75
Discharge: HOME | End: 2025-08-06
Attending: SURGERY
Payer: MEDICARE

## 2025-08-06 ENCOUNTER — PATIENT MESSAGE (OUTPATIENT)
Dept: SURGICAL ONCOLOGY | Facility: CLINIC | Age: 75
End: 2025-08-06

## 2025-08-06 ENCOUNTER — TELEPHONE (OUTPATIENT)
Dept: SURGICAL ONCOLOGY | Facility: CLINIC | Age: 75
End: 2025-08-06

## 2025-08-06 ENCOUNTER — OFFICE VISIT (OUTPATIENT)
Dept: SURGICAL ONCOLOGY | Facility: CLINIC | Age: 75
End: 2025-08-06
Payer: MEDICARE

## 2025-08-06 VITALS
TEMPERATURE: 97.2 F | SYSTOLIC BLOOD PRESSURE: 118 MMHG | DIASTOLIC BLOOD PRESSURE: 70 MMHG | WEIGHT: 190 LBS | HEART RATE: 62 BPM | HEIGHT: 65 IN | BODY MASS INDEX: 31.65 KG/M2 | OXYGEN SATURATION: 97 %

## 2025-08-06 VITALS — HEIGHT: 65 IN | BODY MASS INDEX: 32.32 KG/M2 | WEIGHT: 194 LBS

## 2025-08-06 DIAGNOSIS — Z12.31 SCREENING MAMMOGRAM, ENCOUNTER FOR: ICD-10-CM

## 2025-08-06 DIAGNOSIS — N60.12 BILATERAL FIBROCYSTIC BREAST DISEASE: Primary | ICD-10-CM

## 2025-08-06 DIAGNOSIS — R92.333 HETEROGENEOUSLY DENSE TISSUE OF BOTH BREASTS ON MAMMOGRAPHY: ICD-10-CM

## 2025-08-06 DIAGNOSIS — N60.11 BILATERAL FIBROCYSTIC BREAST DISEASE: Primary | ICD-10-CM

## 2025-08-06 PROCEDURE — 77067 SCR MAMMO BI INCL CAD: CPT

## 2025-08-06 PROCEDURE — 77063 BREAST TOMOSYNTHESIS BI: CPT

## 2025-08-06 PROCEDURE — 99213 OFFICE O/P EST LOW 20 MIN: CPT | Performed by: SURGERY

## 2025-08-06 RX ORDER — EZETIMIBE 10 MG/1
10 TABLET ORAL DAILY
COMMUNITY

## 2025-08-08 ENCOUNTER — TELEPHONE (OUTPATIENT)
Dept: SURGICAL ONCOLOGY | Facility: CLINIC | Age: 75
End: 2025-08-08

## (undated) DEVICE — GLOVE PI ULTRA TOUCH SZ.7.5

## (undated) DEVICE — IV EXTENSION TUBING 33 IN

## (undated) DEVICE — GLOVE INDICATOR UNDERGLOVE SZ 6 BLUE

## (undated) DEVICE — GLOVE PI ULTRA TOUCH SZ 6

## (undated) DEVICE — 2000CC GUARDIAN II: Brand: GUARDIAN

## (undated) DEVICE — SCD SEQUENTIAL COMPRESSION COMFORT SLEEVE MEDIUM KNEE LENGTH: Brand: KENDALL SCD

## (undated) DEVICE — GLOVE INDICATOR PI UNDERGLOVE SZ 6.5 BLUE

## (undated) DEVICE — STRL ALLENTOWN HYSTEROSCOPY PK: Brand: CARDINAL HEALTH

## (undated) DEVICE — PREMIUM DRY TRAY LF: Brand: MEDLINE INDUSTRIES, INC.

## (undated) DEVICE — PVC URETHRAL CATHETER: Brand: DOVER

## (undated) DEVICE — GLOVE PI ULTRA TOUCH SZ.6.5

## (undated) DEVICE — GLOVE INDICATOR PI UNDERGLOVE SZ 8 BLUE

## (undated) DEVICE — CYSTO TUBING SINGLE IRRIGATION